# Patient Record
Sex: MALE | Race: BLACK OR AFRICAN AMERICAN
[De-identification: names, ages, dates, MRNs, and addresses within clinical notes are randomized per-mention and may not be internally consistent; named-entity substitution may affect disease eponyms.]

---

## 2017-06-13 ENCOUNTER — HOSPITAL ENCOUNTER (INPATIENT)
Dept: HOSPITAL 17 - NEPD | Age: 62
LOS: 3 days | Discharge: HOME | DRG: 308 | End: 2017-06-16
Attending: HOSPITALIST | Admitting: HOSPITALIST
Payer: COMMERCIAL

## 2017-06-13 VITALS
TEMPERATURE: 97.2 F | DIASTOLIC BLOOD PRESSURE: 89 MMHG | HEART RATE: 73 BPM | SYSTOLIC BLOOD PRESSURE: 156 MMHG | RESPIRATION RATE: 18 BRPM | OXYGEN SATURATION: 95 %

## 2017-06-13 VITALS
SYSTOLIC BLOOD PRESSURE: 157 MMHG | HEART RATE: 108 BPM | DIASTOLIC BLOOD PRESSURE: 111 MMHG | OXYGEN SATURATION: 97 % | RESPIRATION RATE: 20 BRPM

## 2017-06-13 VITALS
HEART RATE: 113 BPM | RESPIRATION RATE: 16 BRPM | OXYGEN SATURATION: 96 % | TEMPERATURE: 97.9 F | DIASTOLIC BLOOD PRESSURE: 109 MMHG | SYSTOLIC BLOOD PRESSURE: 178 MMHG

## 2017-06-13 VITALS
DIASTOLIC BLOOD PRESSURE: 106 MMHG | OXYGEN SATURATION: 97 % | TEMPERATURE: 97.4 F | RESPIRATION RATE: 18 BRPM | HEART RATE: 90 BPM | SYSTOLIC BLOOD PRESSURE: 152 MMHG

## 2017-06-13 VITALS
OXYGEN SATURATION: 96 % | HEART RATE: 87 BPM | RESPIRATION RATE: 16 BRPM | DIASTOLIC BLOOD PRESSURE: 59 MMHG | SYSTOLIC BLOOD PRESSURE: 121 MMHG | TEMPERATURE: 97.5 F

## 2017-06-13 VITALS
OXYGEN SATURATION: 98 % | SYSTOLIC BLOOD PRESSURE: 150 MMHG | HEART RATE: 104 BPM | DIASTOLIC BLOOD PRESSURE: 88 MMHG | RESPIRATION RATE: 20 BRPM

## 2017-06-13 VITALS
DIASTOLIC BLOOD PRESSURE: 75 MMHG | RESPIRATION RATE: 20 BRPM | SYSTOLIC BLOOD PRESSURE: 144 MMHG | HEART RATE: 102 BPM | OXYGEN SATURATION: 98 %

## 2017-06-13 VITALS — HEIGHT: 67 IN | BODY MASS INDEX: 29.55 KG/M2 | WEIGHT: 188.27 LBS

## 2017-06-13 VITALS — OXYGEN SATURATION: 95 %

## 2017-06-13 VITALS — HEART RATE: 92 BPM

## 2017-06-13 DIAGNOSIS — I48.91: Primary | ICD-10-CM

## 2017-06-13 DIAGNOSIS — I11.0: ICD-10-CM

## 2017-06-13 DIAGNOSIS — M06.9: ICD-10-CM

## 2017-06-13 DIAGNOSIS — M48.00: ICD-10-CM

## 2017-06-13 DIAGNOSIS — F17.210: ICD-10-CM

## 2017-06-13 DIAGNOSIS — M19.90: ICD-10-CM

## 2017-06-13 DIAGNOSIS — J45.909: ICD-10-CM

## 2017-06-13 DIAGNOSIS — I50.21: ICD-10-CM

## 2017-06-13 LAB
ANION GAP SERPL CALC-SCNC: 7 MEQ/L (ref 5–15)
APTT BLD: 25.2 SEC (ref 24.3–30.1)
BASOPHILS # BLD AUTO: 0.1 TH/MM3 (ref 0–0.2)
BASOPHILS NFR BLD: 0.7 % (ref 0–2)
BUN SERPL-MCNC: 14 MG/DL (ref 7–18)
CHLORIDE SERPL-SCNC: 109 MEQ/L (ref 98–107)
EOSINOPHIL # BLD: 0.2 TH/MM3 (ref 0–0.4)
EOSINOPHIL NFR BLD: 2.3 % (ref 0–4)
ERYTHROCYTE [DISTWIDTH] IN BLOOD BY AUTOMATED COUNT: 14.3 % (ref 11.6–17.2)
GFR SERPLBLD BASED ON 1.73 SQ M-ARVRAT: 84 ML/MIN (ref 89–?)
HCO3 BLD-SCNC: 24.4 MEQ/L (ref 21–32)
HCT VFR BLD CALC: 44 % (ref 39–51)
HEMO FLAGS: (no result)
INR PPP: 0.9 RATIO
LYMPHOCYTES # BLD AUTO: 1.9 TH/MM3 (ref 1–4.8)
LYMPHOCYTES NFR BLD AUTO: 24 % (ref 9–44)
MAGNESIUM SERPL-MCNC: 1.9 MG/DL (ref 1.5–2.5)
MCH RBC QN AUTO: 29.1 PG (ref 27–34)
MCHC RBC AUTO-ENTMCNC: 31.8 % (ref 32–36)
MCV RBC AUTO: 91.6 FL (ref 80–100)
MONOCYTES NFR BLD: 3.4 % (ref 0–8)
NEUTROPHILS # BLD AUTO: 5.5 TH/MM3 (ref 1.8–7.7)
NEUTROPHILS NFR BLD AUTO: 69.6 % (ref 16–70)
PLATELET # BLD: 235 TH/MM3 (ref 150–450)
POTASSIUM SERPL-SCNC: 4.8 MEQ/L (ref 3.5–5.1)
PROTHROMBIN TIME: 10.4 SEC (ref 9.8–11.6)
RBC # BLD AUTO: 4.81 MIL/MM3 (ref 4.5–5.9)
SODIUM SERPL-SCNC: 140 MEQ/L (ref 136–145)
WBC # BLD AUTO: 7.9 TH/MM3 (ref 4–11)

## 2017-06-13 PROCEDURE — 85730 THROMBOPLASTIN TIME PARTIAL: CPT

## 2017-06-13 PROCEDURE — 71010: CPT

## 2017-06-13 PROCEDURE — 80048 BASIC METABOLIC PNL TOTAL CA: CPT

## 2017-06-13 PROCEDURE — 85610 PROTHROMBIN TIME: CPT

## 2017-06-13 PROCEDURE — 84484 ASSAY OF TROPONIN QUANT: CPT

## 2017-06-13 PROCEDURE — 83036 HEMOGLOBIN GLYCOSYLATED A1C: CPT

## 2017-06-13 PROCEDURE — 90732 PPSV23 VACC 2 YRS+ SUBQ/IM: CPT

## 2017-06-13 PROCEDURE — 93005 ELECTROCARDIOGRAM TRACING: CPT

## 2017-06-13 PROCEDURE — 85025 COMPLETE CBC W/AUTO DIFF WBC: CPT

## 2017-06-13 PROCEDURE — 80061 LIPID PANEL: CPT

## 2017-06-13 PROCEDURE — 82272 OCCULT BLD FECES 1-3 TESTS: CPT

## 2017-06-13 PROCEDURE — 85027 COMPLETE CBC AUTOMATED: CPT

## 2017-06-13 PROCEDURE — 83735 ASSAY OF MAGNESIUM: CPT

## 2017-06-13 PROCEDURE — 84443 ASSAY THYROID STIM HORMONE: CPT

## 2017-06-13 PROCEDURE — 96375 TX/PRO/DX INJ NEW DRUG ADDON: CPT

## 2017-06-13 PROCEDURE — 83880 ASSAY OF NATRIURETIC PEPTIDE: CPT

## 2017-06-13 PROCEDURE — 96374 THER/PROPH/DIAG INJ IV PUSH: CPT

## 2017-06-13 PROCEDURE — 93306 TTE W/DOPPLER COMPLETE: CPT

## 2017-06-13 RX ADMIN — HEPARIN SODIUM SCH MLS/HR: 10000 INJECTION, SOLUTION INTRAVENOUS at 15:52

## 2017-06-13 RX ADMIN — SODIUM CHLORIDE SCH MLS/HR: 900 INJECTION, SOLUTION INTRAVENOUS at 15:30

## 2017-06-13 NOTE — EKG
Date Performed: 06/13/2017       Time Performed: 14:48:48

 

PTAGE:      61 years

 

EKG:      Multifocal atrial tachycardia Nonspecific T wave changes ABNORMAL ECG 

 

NO PREVIOUS TRACING            

 

DOCTOR:   Guido Ramires  Interpretating Date/Time  06/13/2017 18:07:16

## 2017-06-13 NOTE — PD
HPI


Chief Complaint:  Respiratory Symptoms


Time Seen by Provider:  14:43


Travel History


International Travel<30 days:  No


Contact w/Intl Traveler<30days:  No


Traveled to known affect area:  No





History of Present Illness


HPI


61-year-old male came to the emergency room with history of shortness of 

breath.  Patient was tachycardic in triage.  He was atrial fibrillation with 

RVR.  Patient has history of atrial fibrillation but not on any medications a 

blood thinners.  He says he has been short of breath for past 1 week.  Patient 

has recently moved from [place] and does not have a primary care here.  He 

was short of breath sitting and talking to me.  There has been history of 

orthopnea.  No history of pedal edema or chest pain.  No history of fever or 

chills.  Heart rate was in 1 teens to 120s.





[facility]


Past Medical History


*** Narrative Medical


List of his past medical, surgical, social and family history as reviewed from 

the nursing note.


Respiratory:  Yes (asthma)





Social History


Tobacco Use:  No





Allergies-Medications


(Allergen,Severity, Reaction):  


Coded Allergies:  


     No Known Allergies (Unverified , 6/13/17)


Comments


No known allergies


Reported Meds & Prescriptions





Reported Meds & Active Scripts


Active


Reported


Oyster Shell Calcium + D (Calcium Carbonate-Cholecalciferol) 500-400 Mg-Unit 

Tab 1 Tab PO DAILY


Motrin Ib (Ibuprofen) 200 Mg Tablet 800 Mg PO  PRN


Methotrexate 2.5 Mg Tab 20 Mg PO Q7D





Narrative Medication


List of her medications a been reviewed from the nursing note.





Review of Systems


Except as stated in HPI:  all other systems reviewed are Neg





Physical Exam


Narrative


GENERAL: Awake, alert, moderate distress


SKIN: Focused skin assessment warm/dry.


HEAD: Atraumatic. Normocephalic. 


EYES: Pupils equal and round. No scleral icterus. No injection or drainage. 


ENT: No nasal bleeding or discharge.  Mucous membranes pink and moist.


NECK: Trachea midline. No JVD. 


CARDIOVASCULAR: Irregularly irregular rhythm, RVR.  No murmur appreciated.


RESPIRATORY: Tachypnea. Clear to auscultation. Breath sounds equal bilaterally. 


GASTROINTESTINAL: Abdomen soft, non-tender, nondistended. Hepatic and splenic 

margins not palpable. 


MUSCULOSKELETAL: No obvious deformities. No clubbing.  No cyanosis.  No edema. 


NEUROLOGICAL: Awake and alert. No obvious cranial nerve deficits.  Motor 

grossly within normal limits. Normal speech.


PSYCHIATRIC: Appropriate mood and affect; insight and judgment normal.





Data


Data


Last Documented VS





Vital Signs








  Date Time  Temp Pulse Resp B/P Pulse Ox O2 Delivery O2 Flow Rate FiO2


 


6/13/17 16:03  108 20 157/111 97 Room Air  


 


6/13/17 14:47 97.9       








Orders





 Complete Blood Count With Diff (6/13/17 14:57)


Basic Metabolic Panel (Bmp) (6/13/17 14:57)


B-Type Natriuretic Peptide (6/13/17 14:57)


Prothrombin Time / Inr (Pt) (6/13/17 14:57)


Magnesium (Mg) (6/13/17 14:57)


Troponin I (6/13/17 14:57)


Iv Access Insert/Monitor (6/13/17 14:57)


Ecg Monitoring (6/13/17 14:57)


Oximetry (6/13/17 14:57)


Oxygen Administration (6/13/17 14:57)


Chest, Single Ap (6/13/17 14:57)


Sodium Chloride 0.9% Flush (Ns Flush) (6/13/17 15:00)


Vital Signs (Adult) Q15MX4,Q4H (6/13/17 14:57)


Cardiac Monitor / Telemetry MELE.Q8H (6/13/17 14:57)


Cardiac Rhythm MELE.Q8H (6/13/17 14:57)


Notify Dr: Other (6/13/17 14:57)


Diltiazem Inj (Cardizem Inj) (6/13/17 15:00)


Diltiazem Inj (Cardizem Inj) (6/13/17 15:00)


Heparin Infusion MELE.Q1H (6/13/17 15:25)


Heparin Inj (Heparin Inj) (6/13/17 15:30)


Heparin Inj (Heparin Inj) (6/13/17 21:30)


Heparin Inj (Heparin Inj) (6/13/17 21:30)


Heparin-D5w Inj (Heparin-D5w Inj) (6/13/17 15:30)


Act Partial Throm Time (Ptt) (6/13/17 15:25)


Cbc No Diff, Includes Plts (6/16/17 06:00)


Act Partial Throm Time (Ptt) (6/13/17 22:25)


Occult Blood (Hemoccult) Stool (6/13/17 15:25)


Electrocardiogram (6/13/17 14:48)


Admit Order (Ed Use Only) (6/13/17 16:17)





Labs





 Laboratory Tests








Test 6/13/17





 15:08


 


White Blood Count 7.9 TH/MM3


 


Red Blood Count 4.81 MIL/MM3


 


Hemoglobin 14.0 GM/DL


 


Hematocrit 44.0 %


 


Mean Corpuscular Volume 91.6 FL


 


Mean Corpuscular Hemoglobin 29.1 PG


 


Mean Corpuscular Hemoglobin 31.8 %





Concent 


 


Red Cell Distribution Width 14.3 %


 


Platelet Count 235 TH/MM3


 


Mean Platelet Volume 10.3 FL


 


Neutrophils (%) (Auto) 69.6 %


 


Lymphocytes (%) (Auto) 24.0 %


 


Monocytes (%) (Auto) 3.4 %


 


Eosinophils (%) (Auto) 2.3 %


 


Basophils (%) (Auto) 0.7 %


 


Neutrophils # (Auto) 5.5 TH/MM3


 


Lymphocytes # (Auto) 1.9 TH/MM3


 


Monocytes # (Auto) 0.3 TH/MM3


 


Eosinophils # (Auto) 0.2 TH/MM3


 


Basophils # (Auto) 0.1 TH/MM3


 


CBC Comment DIFF FINAL 


 


Differential Comment  


 


Prothrombin Time 10.4 SEC


 


Prothromb Time International 0.9 RATIO





Ratio 


 


Activated Partial 25.2 SEC





Thromboplast Time 


 


Sodium Level 140 MEQ/L


 


Potassium Level 4.8 MEQ/L


 


Chloride Level 109 MEQ/L


 


Carbon Dioxide Level 24.4 MEQ/L


 


Anion Gap 7 MEQ/L


 


Blood Urea Nitrogen 14 MG/DL


 


Creatinine 1.09 MG/DL


 


Estimat Glomerular Filtration 84 ML/MIN





Rate 


 


Random Glucose 75 MG/DL


 


Calcium Level 8.7 MG/DL


 


Magnesium Level 1.9 MG/DL


 


Troponin I 0.08 NG/ML


 


B-Type Natriuretic Peptide 402 PG/ML











MDM


Medical Decision Making


Medical Screen Exam Complete:  Yes


Emergency Medical Condition:  Yes


Medical Record Reviewed:  Yes


Interpretation(s)


Twelve-lead EKG was reviewed by me.  Atrial fibrillation, RVR, left axis 

deviation.  Heart rate of 120 bpm.


Differential Diagnosis


Atrial fibrillation, RVR, CHF, ACS


Narrative Course


3:34 PM patient was given IV Cardizem bolus 20 mg along with a drip.  Patient 

is not on any blood thinners.  I'm starting him on heparin bolus and drip.  

Awaiting for the blood tests and chest x-ray to be resulted.  Patient will 

eventually require admission.


Critical Care Narrative


Aggregate critical care time was 45 minutes. Time to perform other separately 

billable procedures was not  


included in the critical care time. My time did not include minutes spent 

treating any other patients simultaneously or on  


activities that did not directly contribute to the patient's treatment.  





The services I provided to this patient were to treat and/or prevent clinically 

significant deterioration that could result  


in: A. fib with RVR, dyspnea, elevated troponin, Cardizem bolus and drip, 

heparin bolus and drip





I provided critical care services requiring my management, as noted below:


Chart data review, documentation time, medication orders and management, vital 

sign assessments/reviewing monitor data,  


ordering and reviewing lab tests, ordering and interpreting/reviewing x-rays 

and diagnostic studies, care of the patient  


and discussion of the patient with the admitting physicians.





Procedures


EKG Prior to Arrival:  No





Diagnosis





 Primary Impression:  


 Atrial fibrillation with RVR


 Additional Impressions:  


 Shortness of breath at rest


 Elevated troponin I level


 CHF (congestive heart failure)


 Qualified Code:  I50.9 - Congestive heart failure, unspecified congestive 

heart failure chronicity, unspecified congestive heart failure type





Admitting Information


Admitting Physician Requests:  Admit


Scripts


Oxycodone 5 Mg Tab5 Mg PO Q6HR PRN (pain) #14 TAB


   Prov:Erik García DO         6/16/17 


Metoprolol Tartrate (Lopressor)50 Mg Tab75 Mg PO Q12HR  #90 TAB


   Prov:Erik García DO         6/16/17 


Apixaban (Eliquis)5 Mg Tab5 Mg PO BID  #60 TAB


   Prov:Erik García DO         6/16/17 


Folic Acid 20 Mg Cap10 Mg PO DAILY  #0 CAP


   Prov:Erik García DO         6/16/17








Randolph Jacques MD Jun 13, 2017 14:44

## 2017-06-13 NOTE — MB
cc:

NALLELY QUEZADA MD

****

 

 

DATE OF CONSULTATION

6/13/2017

 

REASON FOR CONSULTATION

Atrial fibrillation with rapid ventricular rate.

 

HISTORY OF PRESENT ILLNESS

Mr. Kowalski is a 61-year-old man who does have a history of irregular heartbeat.

He presented to the emergency room with shortness of breath over the last 3

days and fatigue over the last week.  The patient was noted to be in atrial

fibrillation with rapid ventricular rate in the emergency room.  Cardiology was

subsequently consulted.

 

PAST MEDICAL HISTORY

Significant for:

1. Dysrhythmia.

2. Hypertension.

3. Asthma.

 

ALLERGIES

NO KNOWN DRUG ALLERGIES.

 

MEDICATIONS

Outpatient medications include:

1. Motrin.

2. Amlodipine 5 milligrams daily.

3. Losartan 12.5 milligrams daily.

4. Methotrexate 2.5 mg a day.

 

REVIEW OF SYSTEMS

Except as mentioned in the HPI all 12 systems are negative.

 

FAMILY HISTORY

Positive for mother who passed away of an myocardial infarction.

 

SOCIAL HISTORY

The patient does smoke three cigarettes a day and has occasional alcohol use.

 

PHYSICAL EXAMINATION

VITAL SIGNS: 102, 20, 134/75.

GENERAL: He is a well appearing man who is in no apparent distress.

NECK: Free from JVD.

LUNGS: Decreased but clear to auscultation.

CARDIOVASCULAR: On examination he has an irregularly irregular rhythm. No rubs

or gallops are appreciated.

ABDOMEN: Soft.

EXTREMITIES: Are free from edema.

 

IMAGING

Chest x-ray is negative for any acute process.

 

LABORATORY FINDINGS

Significant for white count of 7.9.

 

Creatinine of 1.09.

 

Troponin was 0.08.

 

And BNP is 402.

 

EKG does show atrial fibrillation with rapid ventricular rate.

 

IMPRESSION

1. Atrial fibrillation - the patient does have a history of an  irregular

     heartbeat.  It is not clear if he does have a history of atrial

     fibrillation. In case I do agree with the Cardize for rate control and BP

     control, particularly as he does have a history of asthma as well. The

     patient's [id number] score is currently 1 for hypertension, thus long

     term anticoagulation with aspirin would be reasonable.

2. Hypertension - I do agree with discontinuing the amlodipine and losartan.

3. Indeterminate troponin - this is likely secondary to the atrial fibrillation

     with rapid ventricular rate.

 

 

 

                              _________________________________

                              RYANN Armendariz/KK

D:  6/13/2017/6:13 PM

T:  6/13/2017/6:25 PM

Visit #:  [id number]

Job #:  [id number]

## 2017-06-13 NOTE — HHI.HP
__________________________________________________





HPI


[facility]


[facility]


Primary Care Physician


No Primary Care Physician


Admission Diagnosis


A. fib with RVR, shortness of breath


Diagnoses:  


Chief Complaint:  


Shortness of breath


Travel History


International Travel<30 Days:  No


Contact w/Intl Traveler <30 Da:  No


Traveled to Known Affected Are:  No


History of Present Illness


61-year-old male with past medical history of HTN, RA, spinal stenosis, DJD, OA 

who presented for shortness of breath.  The patient states that he's been 

having shortness breath and dyspnea with exertion for the past week.  He states 

that is been getting worse over the past 2 days.  He complains of palpitations 

and feeling like his heart is racing.  He denies any chest pain.  He denies 

ever experiencing any symptoms like this before.  He states someone mentioned 

about 30 years ago that he might have an irregular heartbeat.  He denies ever 

being told he has atrial fibrillation.  He's never seen a cardiologist.  He 

denies any recent illness, but has had a cough productive with white sputum for 

the last 3 days.  He denies any fever, chills, nausea, vomiting, diarrhea.





Review of Systems


Except as stated in HPI:  all other systems reviewed are Neg


Ambulates using a cane





Past Family Social History


Past Medical History


Hypertension


Rheumatoid arthritis/spinal stenosis/degenerative joint disease/osteoarthritis


Past Surgical History


Right knee surgery 2


Left knee surgery 2


Spinal surgery 2


Reported Medications





Oyster Shell Calcium + D (Calcium Carbonate-Cholecalciferol) 500-400 Mg-Unit 

Tab 1 Tab PO DAILY


Os-Masood Calcium + D3 (Calcium Carbonate-Cholecalciferol) 500-200 Mg-Unit Tab 1 

Tab PO DAILY


Folic Acid 20 Mg Cap 20 Mg PO DAILY


Motrin Ib (Ibuprofen) 200 Mg Tablet 800 Mg PO  PRN


Amlodipine (Amlodipine Besylate) 5 Mg Tab 5 Mg PO DAILY


Losartan (Losartan Potassium) 25 Mg Tab 12.5 Mg PO DAILY


Methotrexate 2.5 Mg Tab 20 Mg PO Q7D


Allergies:  


Coded Allergies:  


     No Known Allergies (Unverified , 6/13/17)


Active Ordered Medications





 Current Medications








 Medications


  (Trade)  Dose


 Ordered  Sig/Desire


 Route  Start Time


 Stop Time Status Last Admin


 


 Sodium Chloride 2


 ml  2 ml  UNSCH  PRN


 IVF  6/13/17 15:00


     


 


 


  (Cardizem Inj/NS


 Inj)  125 ml @ 0


 mls/hr  TITRATE


 IV  6/13/17 15:00


    6/13/17 15:30


 


 


  (Heparin Inj)  5,000 units  UNSCH  PRN


 IV  6/13/17 21:30


     


 


 


 Heparin Sodium


  (Porcine) 2500


 units  2,500 units  UNSCH  PRN


 IV  6/13/17 21:30


     


 


 


  (Heparin-D5W Inj)  250 ml @ 0


 mls/hr  TITRATE


 IV  6/13/17 15:30


    6/13/17 15:52


 


 


  (Cozaar)  12.5 mg  DAILY


 PO  6/14/17 09:00


     


 


 


 Non-Formulary


 Medication  20 mg  DAILY


 PO  6/14/17 09:00


   UNV  


 


 


  (Pill Splitter)  1 ea  UNSCH  PRN


 OTHER  6/13/17 16:45


     


 








Family History


Mother passed away of a heart attack


Social History


Smokes about 3 cigarettes daily


Has a couple of beers twice a week 


Denies any substance use





Physical Exam


Vital Signs





 Vital Signs








  Date Time  Temp Pulse Resp B/P Pulse Ox O2 Delivery O2 Flow Rate FiO2


 


6/13/17 16:03  108 20 157/111 97 Room Air  


 


6/13/17 15:18     98 Room Air  


 


6/13/17 14:47   16  96   


 


6/13/17 14:47 97.9 113 16 178/109 96 Room Air  


 


6/13/17 13:31 97.4 90 18 152/106 97   








Physical Exam


GENERAL: Well-developed well-nourished.  In no acute distress.


SKIN: Warm and dry. No lesions noted.


HEENT: Normocephalic. Pupils equal and round. Mucous membranes pink and moist. 


CARDIOVASCULAR: Tachycardic irregular rate and rhythm.  No murmur appreciated.


RESPIRATORY: No accessory muscle use. Clear to auscultation. Breath sounds 

equal bilaterally. 


GASTROINTESTINAL: Abdomen soft, non-tender, nondistended. Bowel sounds x4.


MUSCULOSKELETAL: No obvious deformities. No clubbing or cyanosis.  Trace 

pitting lower extremity edema. 


NEUROLOGICAL: Awake and alert. No focal neurological deficits.  Moves upper and 

lower extremities spontaneously. Normal speech.  Strength 5/5.


PSYCHIATRIC: Appropriate mood and affect; insight and judgment normal.


Laboratory





Laboratory Tests








Test 6/13/17





 15:08


 


White Blood Count 7.9 


 


Red Blood Count 4.81 


 


Hemoglobin 14.0 


 


Hematocrit 44.0 


 


Mean Corpuscular Volume 91.6 


 


Mean Corpuscular Hemoglobin 29.1 


 


Mean Corpuscular Hemoglobin 31.8 





Concent 


 


Red Cell Distribution Width 14.3 


 


Platelet Count 235 


 


Mean Platelet Volume 10.3 


 


Neutrophils (%) (Auto) 69.6 


 


Lymphocytes (%) (Auto) 24.0 


 


Monocytes (%) (Auto) 3.4 


 


Eosinophils (%) (Auto) 2.3 


 


Basophils (%) (Auto) 0.7 


 


Neutrophils # (Auto) 5.5 


 


Lymphocytes # (Auto) 1.9 


 


Monocytes # (Auto) 0.3 


 


Eosinophils # (Auto) 0.2 


 


Basophils # (Auto) 0.1 


 


CBC Comment DIFF FINAL 


 


Differential Comment  


 


Prothrombin Time 10.4 


 


Prothromb Time International 0.9 





Ratio 


 


Sodium Level 140 


 


Potassium Level 4.8 


 


Chloride Level 109 


 


Carbon Dioxide Level 24.4 


 


Anion Gap 7 


 


Blood Urea Nitrogen 14 


 


Creatinine 1.09 


 


Estimat Glomerular Filtration 84 





Rate 


 


Random Glucose 75 


 


Calcium Level 8.7 


 


Magnesium Level 1.9 


 


Troponin I 0.08 


 


B-Type Natriuretic Peptide 402 








Result Diagram:  


6/13/17 1508                                                                   

             6/13/17 1508





Imaging





Last Impressions








Chest X-Ray 6/13/17 1457 Signed





Impressions: 





 Service Date/Time:  Tuesday, June 13, 2017 15:01 - CONCLUSION: No acute 

disease. 





       Bakari Olsen MD 











Assessment and Plan


Assessment and Plan


61-year-old male with past medical history of HTN, RA, spinal stenosis, DJD, OA 

who presented for shortness of breath





New-onset atrial fibrillation with rapid ventricular response: Chadsvasc 1


Reviewed: EKG with A. fib with RVR, rate 120, no definite ischemic changes.  

Troponin 0.08.  .  Chest x-ray clear.  Potassium magnesium within normal 

limits.


-Cardizem and heparin drips started in the ED, continue


-Trend troponins and check TSH


-Start metoprolol and aspirin


-Check echocardiogram


-Consult cardiology





Hypertension: Chronic, currently accelerated.  Hold home amlodipine on 

Cardizem.  Continue losartan.  Started metoprolol.


Discussed Condition With


Patient with SO at bedside, Reece Owusu Jun 13, 2017 17:03

## 2017-06-13 NOTE — RADRPT
EXAM DATE/TIME:  06/13/2017 15:01 

 

[facility] COMPARISON:     

No previous studies available for comparison.

 

                     

INDICATIONS :     

Shortness of breath.

                     

 

MEDICAL HISTORY :     

Hypertension.       Asthma.   

 

SURGICAL HISTORY :     

None.   

 

ENCOUNTER:     

Initial                                        

 

ACUITY:     

1 day      

 

PAIN SCORE:     

0/10

 

LOCATION:     

Bilateral chest 

 

FINDINGS:     

A single view of the chest demonstrates the lungs to be symmetrically aerated without evidence of mas
s, infiltrate or effusion.  The cardiomediastinal contours are unremarkable.  Osseous structures are 
intact.

 

CONCLUSION:     No acute disease.  

 

 

 

 Bakari lOsen MD on June 13, 2017 at 15:32           

Board Certified Radiologist.

 This report was verified electronically.

## 2017-06-14 VITALS
OXYGEN SATURATION: 95 % | HEART RATE: 48 BPM | TEMPERATURE: 97.6 F | DIASTOLIC BLOOD PRESSURE: 83 MMHG | SYSTOLIC BLOOD PRESSURE: 135 MMHG | RESPIRATION RATE: 18 BRPM

## 2017-06-14 VITALS
HEART RATE: 52 BPM | RESPIRATION RATE: 18 BRPM | DIASTOLIC BLOOD PRESSURE: 97 MMHG | SYSTOLIC BLOOD PRESSURE: 153 MMHG | OXYGEN SATURATION: 93 % | TEMPERATURE: 97.5 F

## 2017-06-14 VITALS — OXYGEN SATURATION: 94 %

## 2017-06-14 VITALS
TEMPERATURE: 97.8 F | HEART RATE: 103 BPM | RESPIRATION RATE: 18 BRPM | DIASTOLIC BLOOD PRESSURE: 86 MMHG | OXYGEN SATURATION: 95 % | SYSTOLIC BLOOD PRESSURE: 122 MMHG

## 2017-06-14 VITALS
DIASTOLIC BLOOD PRESSURE: 70 MMHG | RESPIRATION RATE: 18 BRPM | HEART RATE: 94 BPM | SYSTOLIC BLOOD PRESSURE: 104 MMHG | TEMPERATURE: 97.9 F | OXYGEN SATURATION: 98 %

## 2017-06-14 VITALS
DIASTOLIC BLOOD PRESSURE: 79 MMHG | SYSTOLIC BLOOD PRESSURE: 133 MMHG | RESPIRATION RATE: 16 BRPM | HEART RATE: 74 BPM | OXYGEN SATURATION: 97 % | TEMPERATURE: 97.4 F

## 2017-06-14 VITALS — HEART RATE: 90 BPM

## 2017-06-14 VITALS
SYSTOLIC BLOOD PRESSURE: 122 MMHG | RESPIRATION RATE: 18 BRPM | TEMPERATURE: 97.6 F | OXYGEN SATURATION: 94 % | HEART RATE: 78 BPM | DIASTOLIC BLOOD PRESSURE: 73 MMHG

## 2017-06-14 VITALS — HEART RATE: 107 BPM

## 2017-06-14 VITALS — HEART RATE: 88 BPM

## 2017-06-14 VITALS — HEART RATE: 91 BPM

## 2017-06-14 LAB
ANION GAP SERPL CALC-SCNC: 13 MEQ/L (ref 5–15)
APTT BLD: 65.8 SEC (ref 24.3–30.1)
APTT BLD: 70.3 SEC (ref 24.3–30.1)
BUN SERPL-MCNC: 12 MG/DL (ref 7–18)
CHLORIDE SERPL-SCNC: 104 MEQ/L (ref 98–107)
ERYTHROCYTE [DISTWIDTH] IN BLOOD BY AUTOMATED COUNT: 14.5 % (ref 11.6–17.2)
GFR SERPLBLD BASED ON 1.73 SQ M-ARVRAT: 108 ML/MIN (ref 89–?)
HCO3 BLD-SCNC: 22.5 MEQ/L (ref 21–32)
HCT VFR BLD CALC: 42.5 % (ref 39–51)
HDLC SERPL-MCNC: 51.7 MG/DL (ref 40–60)
HEMOGLOBIN A1A: 0.9 %
HEMOGLOBIN A1B: 1.7 %
HEMOGLOBIN AO: 84.6 %
HEMOGLOBIN LA1C: 2 %
HEMOGLOBIN P3: 4 %
LDLC SERPL-MCNC: 61 MG/DL (ref 0–99)
MCH RBC QN AUTO: 28.5 PG (ref 27–34)
MCHC RBC AUTO-ENTMCNC: 31.8 % (ref 32–36)
MCV RBC AUTO: 89.7 FL (ref 80–100)
PLATELET # BLD: 224 TH/MM3 (ref 150–450)
POTASSIUM SERPL-SCNC: 3.2 MEQ/L (ref 3.5–5.1)
RBC # BLD AUTO: 4.74 MIL/MM3 (ref 4.5–5.9)
REVIEW FLAG: (no result)
SODIUM SERPL-SCNC: 139 MEQ/L (ref 136–145)
WBC # BLD AUTO: 8 TH/MM3 (ref 4–11)

## 2017-06-14 RX ADMIN — FUROSEMIDE SCH MG: 20 TABLET ORAL at 09:25

## 2017-06-14 RX ADMIN — METOPROLOL TARTRATE SCH MG: 50 TABLET, FILM COATED ORAL at 20:22

## 2017-06-14 RX ADMIN — HEPARIN SODIUM SCH MLS/HR: 10000 INJECTION, SOLUTION INTRAVENOUS at 21:46

## 2017-06-14 RX ADMIN — FOLIC ACID SCH MG: 1 TABLET ORAL at 13:10

## 2017-06-14 RX ADMIN — SODIUM CHLORIDE SCH MLS/HR: 900 INJECTION, SOLUTION INTRAVENOUS at 13:52

## 2017-06-14 RX ADMIN — POTASSIUM CHLORIDE SCH MEQ: 750 TABLET, FILM COATED, EXTENDED RELEASE ORAL at 09:26

## 2017-06-14 RX ADMIN — POTASSIUM CHLORIDE SCH MEQ: 750 TABLET, FILM COATED, EXTENDED RELEASE ORAL at 20:22

## 2017-06-14 RX ADMIN — HEPARIN SODIUM SCH MLS/HR: 10000 INJECTION, SOLUTION INTRAVENOUS at 05:16

## 2017-06-14 RX ADMIN — METOPROLOL TARTRATE SCH MG: 50 TABLET, FILM COATED ORAL at 13:09

## 2017-06-14 RX ADMIN — FUROSEMIDE SCH MG: 20 TABLET ORAL at 18:05

## 2017-06-14 RX ADMIN — ASPIRIN 81 MG SCH MG: 81 TABLET ORAL at 09:25

## 2017-06-14 NOTE — PD.CARD.PN
Subjective


Subjective Remarks


Pt with SHOB overnight requiring lasix





Objective


Medications





 Current Medications








 Medications


  (Trade)  Dose


 Ordered  Sig/Desire


 Route  Start Time


 Stop Time Status Last Admin


 


 Sodium Chloride 2


 ml  2 ml  UNSCH  PRN


 IVF  6/13/17 15:00


     


 


 


  (Cardizem Inj/NS


 Inj)  125 ml @ 0


 mls/hr  TITRATE


 IV  6/13/17 15:00


    6/13/17 15:30


 


 


  (Heparin Inj)  5,000 units  UNSCH  PRN


 IV  6/13/17 21:30


     


 


 


 Heparin Sodium


  (Porcine) 2500


 units  2,500 units  UNSCH  PRN


 IV  6/13/17 21:30


     


 


 


  (Heparin-D5W Inj)  250 ml @ 0


 mls/hr  TITRATE


 IV  6/13/17 15:30


    6/14/17 05:16


 


 


 Non-Formulary


 Medication  20 mg  DAILY


 PO  6/14/17 09:00


   UNV  


 


 


  (Pill Splitter)  1 ea  UNSCH  PRN


 OTHER  6/13/17 16:45


     


 


 


  (Aspirin Chew)  81 mg  DAILY


 PO  6/14/17 09:00


     


 


 


  (Cardizem Cd)  120 mg  Q12HR


 PO  6/13/17 21:00


    6/13/17 21:21


 


 


  (Pneumovax-23


 Inj)  25 mcg  ONCE ONCE


 IM  6/14/17 10:00


 6/14/17 10:01   


 








Vital Signs / I&O





 Vital Signs








  Date Time  Temp Pulse Resp B/P Pulse Ox O2 Delivery O2 Flow Rate FiO2


 


6/14/17 05:30     97 Room Air  


 


6/14/17 03:35     98 Nasal Cannula 2.00 


 


6/14/17 03:25     96 Room Air  


 


6/14/17 00:00 97.4 93 16 130/79 97   


 


6/14/17 00:00      Room Air  


 


6/13/17 21:45     97 Room Air  


 


6/13/17 20:24  92      


 


6/13/17 20:00      Room Air  


 


6/13/17 20:00 97.5 87 16 121/59 96   


 


6/13/17 18:32      Room Air  


 


6/13/17 18:24 97.2 73 18 156/89 95   


 


6/13/17 17:36  102 20 144/75 98 Room Air  


 


6/13/17 17:21     95   21


 


6/13/17 17:01  104 20 150/88 98 Room Air  


 


6/13/17 16:03  108 20 157/111 97 Room Air  


 


6/13/17 15:18     98 Room Air  


 


6/13/17 14:47   16  96   


 


6/13/17 14:47 97.9 113 16 178/109 96 Room Air  


 


6/13/17 13:31 97.4 90 18 152/106 97   








 I/O








 6/13/17 6/13/17 6/13/17 6/14/17 6/14/17 6/14/17





 07:00 15:00 23:00 07:00 15:00 23:00


 


Intake Total   900 ml   


 


Output Total   400 ml   


 


Balance   500 ml   


 


      


 


Intake Oral   900 ml   


 


Output Urine Total   400 ml   


 


# Bowel Movements   1   








Physical Exam


GENERAL: Well developed, well nourished. No acute distress.


HEENT: Jugular venous pressure is normal. 


CHEST: Lungs clear to auscultation bilaterally. Unlabored respiratory effort.


CARDIAC: irregular rate and rhythm without S3, S4, or murmur.


ABDOMEN: Soft, nontender, no hepatosplenomegaly. Bowel sounds present.


EXTREMITIES: No clubbing, cyanosis, or edema.


Laboratory





Laboratory Tests








Test 6/13/17 6/13/17





 15:08 23:23


 


White Blood Count 7.9 TH/MM3 


 


Red Blood Count 4.81 MIL/MM3 


 


Hemoglobin 14.0 GM/DL 


 


Hematocrit 44.0 % 


 


Mean Corpuscular Volume 91.6 FL 


 


Mean Corpuscular Hemoglobin 29.1 PG 


 


Mean Corpuscular Hemoglobin 31.8 % 





Concent  


 


Red Cell Distribution Width 14.3 % 


 


Platelet Count 235 TH/MM3 


 


Mean Platelet Volume 10.3 FL 


 


Neutrophils (%) (Auto) 69.6 % 


 


Lymphocytes (%) (Auto) 24.0 % 


 


Monocytes (%) (Auto) 3.4 % 


 


Eosinophils (%) (Auto) 2.3 % 


 


Basophils (%) (Auto) 0.7 % 


 


Neutrophils # (Auto) 5.5 TH/MM3 


 


Lymphocytes # (Auto) 1.9 TH/MM3 


 


Monocytes # (Auto) 0.3 TH/MM3 


 


Eosinophils # (Auto) 0.2 TH/MM3 


 


Basophils # (Auto) 0.1 TH/MM3 


 


CBC Comment DIFF FINAL  


 


Differential Comment   


 


Prothrombin Time 10.4 SEC 


 


Prothromb Time International 0.9 RATIO 





Ratio  


 


Activated Partial 25.2 SEC 65.8 SEC





Thromboplast Time  


 


Sodium Level 140 MEQ/L 


 


Potassium Level 4.8 MEQ/L 


 


Chloride Level 109 MEQ/L 


 


Carbon Dioxide Level 24.4 MEQ/L 


 


Anion Gap 7 MEQ/L 


 


Blood Urea Nitrogen 14 MG/DL 


 


Creatinine 1.09 MG/DL 


 


Estimat Glomerular Filtration 84 ML/MIN 





Rate  


 


Random Glucose 75 MG/DL 


 


Calcium Level 8.7 MG/DL 


 


Magnesium Level 1.9 MG/DL 


 


Troponin I 0.08 NG/ML 0.07 NG/ML


 


B-Type Natriuretic Peptide 402 PG/ML 


 


Thyroid Stimulating Hormone  1.630 uIU/ML





3rd Gen  








Imaging





Last 72 hours Impressions








Chest X-Ray 6/14/17 0000 Signed





Impressions: 





 Service Date/Time:  Wednesday, June 14, 2017 04:25 - CONCLUSION: Slight CHF.  

  





 K. Cristofer Lebron MD 


 


Chest X-Ray 6/13/17 1457 Signed





Impressions: 





 Service Date/Time:  Tuesday, June 13, 2017 15:01 - CONCLUSION: No acute 

disease. 





       Bakari Olsen MD 











Assessment and Plan


Assessment and Plan


1. Atrial fibrillation - the patient does have a history of an  irregular


     heartbeat.  It is not clear if he does have a history of atrial


     fibrillation. In case I do agree with the Cardizem for rate control and BP


     control, particularly as he does have a history of asthma as well. The


     patient's [id number] score is currently 1 for hypertension, thus long


     term anticoagulation with aspirin would be reasonable.


   6/14- still with some RVR, now also with CHF


   -change back to POBB


   -CHADS VASC =2 so should have OP anticoagulation, consult case management to 

see if eliquis or pradaxa are on his formulary





2. Hypertension - 


3. Indeterminate troponin - this is likely secondary to the atrial fibrillation


4. CHF- ECHO pending, 


   -add lasix, fluid and sodium restrictions


     with rapid ventricular rate.








Bhumika Alonzo MD Jun 14, 2017 07:28

## 2017-06-14 NOTE — HHI.PR
Subjective


Remarks


The patient was ambulating without difficulty.  He said that he had some edema 

in his lower extremities yesterday.  He said that he had some shortness of 

breath overnight that is improved.  He has been having bowel movements.  No 

acute complaints at this time.  He was about to have an echo performed.





Objective


Vitals





 Vital Signs








  Date Time  Temp Pulse Resp B/P Pulse Ox O2 Delivery O2 Flow Rate FiO2


 


6/14/17 08:00      Room Air  


 


6/14/17 05:30     97 Room Air  


 


6/14/17 04:00 97.6 78 18 122/73 94   


 


6/14/17 03:35     98 Nasal Cannula 2.00 


 


6/14/17 03:25     96 Room Air  


 


6/14/17 00:00 97.4 93 16 130/79 97   


 


6/14/17 00:00      Room Air  


 


6/13/17 21:45     97 Room Air  


 


6/13/17 20:24  92      


 


6/13/17 20:00      Room Air  


 


6/13/17 20:00 97.5 87 16 121/59 96   


 


6/13/17 18:32      Room Air  


 


6/13/17 18:24 97.2 73 18 156/89 95   


 


6/13/17 17:36  102 20 144/75 98 Room Air  


 


6/13/17 17:21     95   21


 


6/13/17 17:01  104 20 150/88 98 Room Air  


 


6/13/17 16:03  108 20 157/111 97 Room Air  


 


6/13/17 15:18     98 Room Air  


 


6/13/17 14:47   16  96   


 


6/13/17 14:47 97.9 113 16 178/109 96 Room Air  


 


6/13/17 13:31 97.4 90 18 152/106 97   








 I/O








 6/13/17 6/13/17 6/13/17 6/14/17 6/14/17 6/14/17





 07:00 15:00 23:00 07:00 15:00 23:00


 


Intake Total   900 ml 869 ml  


 


Output Total   400 ml 1000 ml  


 


Balance   500 ml -131 ml  


 


      


 


Intake Oral   900 ml 480 ml  


 


IV Total    289 ml  


 


Albumin    100 ml  


 


Output Urine Total   400 ml 1000 ml  


 


# Bowel Movements   1 0  








Result Diagram:  


6/13/17 1508                                                                   

             6/13/17 1508





Imaging





Last Impressions








Chest X-Ray 6/14/17 0000 Signed





Impressions: 





 Service Date/Time:  Wednesday, June 14, 2017 04:25 - CONCLUSION: Slight CHF.  

  





 CESAR Lebron MD 








Objective Remarks


GENERAL: Well-developed well-nourished.  In no acute distress.


SKIN: Warm and dry. No lesions noted.


HEENT: Normocephalic. Pupils equal and round. Mucous membranes pink and moist. 


CARDIOVASCULAR: Irregularly irregular.  No murmur appreciated.


RESPIRATORY: No accessory muscle use. Clear to auscultation. Breath sounds 

equal bilaterally. 


GASTROINTESTINAL: Abdomen soft, non-tender, nondistended. Bowel sounds x4.


MUSCULOSKELETAL: No obvious deformities. No clubbing or cyanosis.  Trace 

pitting lower extremity edema. 


NEUROLOGICAL: Awake and alert. No focal neurological deficits.  Moves upper and 

lower extremities spontaneously. Normal speech.  Strength 5/5.


PSYCHIATRIC: Appropriate mood and affect; insight and judgment normal.


Medications and IVs





 Current Medications








 Medications


  (Trade)  Dose


 Ordered  Sig/Desire


 Route  Start Time


 Stop Time Status Last Admin


 


 Sodium Chloride 2


 ml  2 ml  UNSCH  PRN


 IVF  6/13/17 15:00


     


 


 


  (Cardizem Inj/NS


 Inj)  125 ml @ 0


 mls/hr  TITRATE


 IV  6/13/17 15:00


    6/13/17 15:30


 


 


  (Heparin Inj)  5,000 units  UNSCH  PRN


 IV  6/13/17 21:30


     


 


 


 Heparin Sodium


  (Porcine) 2500


 units  2,500 units  UNSCH  PRN


 IV  6/13/17 21:30


     


 


 


  (Heparin-D5W Inj)  250 ml @ 0


 mls/hr  TITRATE


 IV  6/13/17 15:30


    6/14/17 05:16


 


 


 Non-Formulary


 Medication  20 mg  DAILY


 PO  6/14/17 09:00


   UNV  


 


 


  (Pill Splitter)  1 ea  UNSCH  PRN


 OTHER  6/13/17 16:45


     


 


 


  (Aspirin Chew)  81 mg  DAILY


 PO  6/14/17 09:00


     


 


 


  (Pneumovax-23


 Inj)  25 mcg  ONCE ONCE


 IM  6/14/17 10:00


 6/14/17 10:01   


 


 


  (Lopressor)  50 mg  Q8HR


 PO  6/14/17 14:00


     


 


 


  (Lasix)  20 mg  BID@09,18


 PO  6/14/17 09:00


     


 


 


  (KCl)  10 meq  Q12HR


 PO  6/14/17 09:00


     


 











A/P


Assessment and Plan


61-year-old male with past medical history of HTN, RA, spinal stenosis, DJD, OA 

who presented for shortness of breath





New-onset atrial fibrillation with rapid ventricular response


Chadsvasc 2. EKG with A. fib with RVR, rate 120, no definite ischemic changes.  

Troponin peaked at 0.08. TSH WNL.  Cardiology consult appreciated.


- continue Cardizem and heparin drips. PO Lopressor increased by cardiology.


- continue ASA. Will likely be started on full anticoagulation per cardiology.


- Check echocardiogram.





Acute CHF


Unsure if systolic or diastolic. Likely s/t A fib. The patient developed 

shortness of breath and repeat chest x-ray showed evidence of CHF.  BNP is 

elevated over 400.  He received Lasix and his symptoms improved.


- Echo pending.


- Continue cardiac regimen including Lasix.


- Check hemoglobin A1c and lipid profile.





Hypertension


Chronic, currently well controlled.  


- Hold home amlodipine.


- Continue losartan.  


- Started metoprolol.





PPx: Heparin gtt


Discharge Planning


Awaiting clinical improvement








Erik García DO Jun 14, 2017 09:03

## 2017-06-14 NOTE — RADRPT
EXAM DATE/TIME:  06/14/2017 04:25 

 

[facility] COMPARISON:     

CHEST SINGLE AP, June 13, 2017, 15:01.

 

                     

INDICATIONS :     

Shortness of breath. 

                     

 

MEDICAL HISTORY :            

Hypertension. Asthma   

 

SURGICAL HISTORY :     

None.   

 

ENCOUNTER:     

Subsequent                                        

 

ACUITY:     

2 days      

 

PAIN SCORE:     

0/10

 

LOCATION:     

Bilateral chest 

 

FINDINGS:     

There is slight cardiomegaly and perivascular pulmonary edema. Focal consolidation is not seen.

 

CONCLUSION:     Slight CHF.

 

 

 CESAR Lebron MD on June 14, 2017 at 5:27           

Board Certified Radiologist.

 This report was verified electronically.

## 2017-06-14 NOTE — ECHRPT
Indication:   Hypertensive Heart Disease

 

 CONCLUSIONS

 Normal left ventricular size. 

 Wall thickness is normal. 

 The left ventricular systolic function is borderline with an estimated ejection fraction of 50%. 

 The left atrial size is upper limits of normal. 

 Moderate mitral valve regurgitation. 

 Mild mitral annular calcification. 

 Aortic valve sclerosis is present. 

 Mild aortic valve regurgitation. 

 There is trace tricuspid valve regurgitation.

 Mild to moderate pulmonary hypertension.

 

 BP:  157   / 111     HR: 108                      Rhythm:           Atrial fibrillation

 

 MEASUREMENTS  (Male / Female) Normal Values       Technical Quality:Good

 2D ECHO

 LV Diastolic Diameter PLAX        4.4 cm                4.2 - 5.9 / 3.9 - 5.3 cm

 LV Systolic Diameter PLAX         3.4 cm                

 IVS Diastolic Thickness           0.9 cm                0.6 - 1.0 / 0.6 - 0.9 cm

 LVPW Diastolic Thickness          0.8 cm                0.6 - 1.0 / 0.6 - 0.9 cm

 LV Relative Wall Thickness        0.4                   

 RV Internal Dim ED PLAX           2.3 cm                

 LA Systolic Diameter LX           3.6 cm                3.0 - 4.0 / 2.7 - 3.8 cm

 

 M-MODE

 Aortic Root Diameter MM           3.2 cm                

 AV Cusp Separation MM             2.2 cm                

 

 DOPPLER

 AV Peak Velocity                  170.0 cm/s            

 AV Peak Gradient                  11.6 mmHg             

 LVOT Peak Velocity                93.8 cm/s             

 LVOT Peak Gradient                3.5 mmHg              

 MR Peak Velocity                  489.5 cm/s            

 MR Peak Gradient                  95.8 mmHg             

 Mitral E Point Velocity           69.6 cm/s             

 TR Peak Velocity                  343.0 cm/s            

 TR Peak Gradient                  47.1 mmHg             

 

 

 FINDINGS

 

 LEFT VENTRICLE

 Normal left ventricular size. 

 Wall thickness is normal. 

 The left ventricular systolic function is borderline with an estimated ejection fraction of 50%. 

 Left ventricular diastolic function parameters are normal. 

 

 RIGHT VENTRICLE

 Normal right ventricular size and systolic function.  

 

 LEFT ATRIUM

 The left atrial size is upper limits of normal. 

 

 RIGHT ATRIUM

 The right atrial size is normal.  

 

 ATRIAL SEPTUM

 Normal atrial septal thickness without atrial level shunting by limited color doppler interrogation.
  

 

 AORTA

 The aortic root and proximal ascending aorta are normal in size on limited imaging.  

 

 MITRAL VALVE

 Moderate mitral valve regurgitation. 

 Mild mitral annular calcification. 

 

 AORTIC VALVE

 Aortic valve sclerosis is present. 

 Mild aortic valve regurgitation. 

 

 TRICUSPID VALVE

 There is trace tricuspid valve regurgitation. 

 Mild to moderate pulmonary hypertension.

 

 PULMONARY VALVE

 The pulmonary valve is not well visualized.  

 

 VESSELS

 The inferior vena cava is normal in size.  

 

 PERICARDIUM

 No pericardial effusion.  

 

 

 

 

  Juana De Souza MD, FACC

  (Electronically Signed)

  Final Date:14 June 2017 13:22

  Amended:   14 June 2017 13:29

## 2017-06-15 VITALS
SYSTOLIC BLOOD PRESSURE: 113 MMHG | RESPIRATION RATE: 18 BRPM | TEMPERATURE: 97.5 F | DIASTOLIC BLOOD PRESSURE: 78 MMHG | OXYGEN SATURATION: 95 % | HEART RATE: 65 BPM

## 2017-06-15 VITALS
TEMPERATURE: 97.6 F | SYSTOLIC BLOOD PRESSURE: 141 MMHG | DIASTOLIC BLOOD PRESSURE: 72 MMHG | HEART RATE: 76 BPM | RESPIRATION RATE: 18 BRPM | OXYGEN SATURATION: 94 %

## 2017-06-15 VITALS
TEMPERATURE: 97.5 F | HEART RATE: 62 BPM | DIASTOLIC BLOOD PRESSURE: 77 MMHG | RESPIRATION RATE: 18 BRPM | OXYGEN SATURATION: 96 % | SYSTOLIC BLOOD PRESSURE: 108 MMHG

## 2017-06-15 VITALS — HEART RATE: 94 BPM

## 2017-06-15 VITALS
SYSTOLIC BLOOD PRESSURE: 106 MMHG | HEART RATE: 62 BPM | RESPIRATION RATE: 18 BRPM | OXYGEN SATURATION: 95 % | DIASTOLIC BLOOD PRESSURE: 78 MMHG | TEMPERATURE: 97.6 F

## 2017-06-15 VITALS
RESPIRATION RATE: 18 BRPM | HEART RATE: 43 BPM | SYSTOLIC BLOOD PRESSURE: 104 MMHG | DIASTOLIC BLOOD PRESSURE: 70 MMHG | TEMPERATURE: 97.7 F | OXYGEN SATURATION: 92 %

## 2017-06-15 VITALS — OXYGEN SATURATION: 95 %

## 2017-06-15 VITALS
HEART RATE: 91 BPM | RESPIRATION RATE: 18 BRPM | SYSTOLIC BLOOD PRESSURE: 142 MMHG | TEMPERATURE: 97.4 F | DIASTOLIC BLOOD PRESSURE: 90 MMHG | OXYGEN SATURATION: 94 %

## 2017-06-15 VITALS — HEART RATE: 76 BPM

## 2017-06-15 LAB
ANION GAP SERPL CALC-SCNC: 8 MEQ/L (ref 5–15)
APTT BLD: 102.1 SEC (ref 24.3–30.1)
APTT BLD: 59.8 SEC (ref 24.3–30.1)
BUN SERPL-MCNC: 16 MG/DL (ref 7–18)
CHLORIDE SERPL-SCNC: 108 MEQ/L (ref 98–107)
ERYTHROCYTE [DISTWIDTH] IN BLOOD BY AUTOMATED COUNT: 14.2 % (ref 11.6–17.2)
GFR SERPLBLD BASED ON 1.73 SQ M-ARVRAT: 86 ML/MIN (ref 89–?)
HCO3 BLD-SCNC: 24.8 MEQ/L (ref 21–32)
HCT VFR BLD CALC: 43.1 % (ref 39–51)
MAGNESIUM SERPL-MCNC: 1.9 MG/DL (ref 1.5–2.5)
MCH RBC QN AUTO: 28.8 PG (ref 27–34)
MCHC RBC AUTO-ENTMCNC: 32.1 % (ref 32–36)
MCV RBC AUTO: 89.7 FL (ref 80–100)
PLATELET # BLD: 197 TH/MM3 (ref 150–450)
POTASSIUM SERPL-SCNC: 3.9 MEQ/L (ref 3.5–5.1)
RBC # BLD AUTO: 4.8 MIL/MM3 (ref 4.5–5.9)
REVIEW FLAG: (no result)
SODIUM SERPL-SCNC: 141 MEQ/L (ref 136–145)
WBC # BLD AUTO: 8 TH/MM3 (ref 4–11)

## 2017-06-15 RX ADMIN — POTASSIUM CHLORIDE SCH MEQ: 750 TABLET, FILM COATED, EXTENDED RELEASE ORAL at 08:07

## 2017-06-15 RX ADMIN — FUROSEMIDE SCH MG: 20 TABLET ORAL at 08:07

## 2017-06-15 RX ADMIN — FOLIC ACID SCH MG: 1 TABLET ORAL at 08:08

## 2017-06-15 RX ADMIN — APIXABAN SCH MG: 5 TABLET, FILM COATED ORAL at 20:25

## 2017-06-15 RX ADMIN — ASPIRIN 81 MG SCH MG: 81 TABLET ORAL at 08:08

## 2017-06-15 RX ADMIN — METOPROLOL TARTRATE SCH MG: 50 TABLET, FILM COATED ORAL at 05:54

## 2017-06-15 RX ADMIN — METOPROLOL TARTRATE SCH MG: 50 TABLET, FILM COATED ORAL at 20:25

## 2017-06-15 RX ADMIN — APIXABAN SCH MG: 5 TABLET, FILM COATED ORAL at 11:40

## 2017-06-15 NOTE — PD.CARD.PN
Subjective


Subjective Remarks


Pt without complaints





Objective


Medications





 Current Medications








 Medications


  (Trade)  Dose


 Ordered  Sig/Desire


 Route  Start Time


 Stop Time Status Last Admin


 


 Sodium Chloride 2


 ml  2 ml  UNSCH  PRN


 IVF  6/13/17 15:00


     


 


 


  (Cardizem Inj/NS


 Inj)  125 ml @ 0


 mls/hr  TITRATE


 IV  6/13/17 15:00


    6/14/17 13:52


 


 


  (Heparin Inj)  5,000 units  UNSCH  PRN


 IV  6/13/17 21:30


     


 


 


 Heparin Sodium


  (Porcine) 2500


 units  2,500 units  UNSCH  PRN


 IV  6/13/17 21:30


     


 


 


  (Heparin-D5W Inj)  250 ml @ 0


 mls/hr  TITRATE


 IV  6/13/17 15:30


    6/14/17 21:46


 


 


  (Pill Splitter)  1 ea  UNSCH  PRN


 OTHER  6/13/17 16:45


     


 


 


  (Aspirin Chew)  81 mg  DAILY


 PO  6/14/17 09:00


    6/14/17 09:25


 


 


  (Lopressor)  50 mg  Q8HR


 PO  6/14/17 14:00


    6/15/17 05:54


 


 


  (Lasix)  20 mg  BID@09,18


 PO  6/14/17 09:00


    6/14/17 18:05


 


 


  (KCl)  10 meq  Q12HR


 PO  6/14/17 09:00


    6/14/17 20:22


 


 


  (Folate)  10 mg  DAILY


 PO  6/14/17 12:00


    6/14/17 13:10


 


 


  (Roxicodone)  5 mg  Q4H  PRN


 PO  6/14/17 14:30


    6/15/17 05:54


 








Vital Signs / I&O





 Vital Signs








  Date Time  Temp Pulse Resp B/P Pulse Ox O2 Delivery O2 Flow Rate FiO2


 


6/15/17 04:00 97.7 43 18 109/75 92   


 


6/15/17 04:00      Room Air  


 


6/15/17 04:00 97.7 80 20 104/70 93   


 


6/15/17 00:22  94      


 


6/15/17 00:00 97.5 65 18 113/78 95   


 


6/15/17 00:00      Room Air  


 


6/14/17 20:28  91      


 


6/14/17 20:00 97.6 48 18 135/83 95   


 


6/14/17 20:00      Room Air  


 


6/14/17 16:00  94      


 


6/14/17 16:00      Room Air  


 


6/14/17 16:00 97.9 84 18 104/70 98   


 


6/14/17 12:00 97.8 108 18 122/86 95   


 


6/14/17 12:00  103      


 


6/14/17 12:00      Room Air  


 


6/14/17 11:42  107      


 


6/14/17 10:09     94   








 I/O








 6/14/17 6/14/17 6/14/17 6/15/17 6/15/17 6/15/17





 07:00 15:00 23:00 07:00 15:00 23:00


 


Intake Total 869 ml 960 ml 177 ml 163 ml  


 


Output Total 1000 ml 1900 ml  2950 ml  


 


Balance -131 ml -940 ml 177 ml -2787 ml  


 


      


 


Intake Oral 480 ml 960 ml    


 


IV Total 289 ml  177 ml 163 ml  


 


Albumin 100 ml     


 


Output Urine Total 1000 ml 1900 ml  2950 ml  


 


# Bowel Movements 0 2    








Physical Exam


GENERAL: Well developed, well nourished. No acute distress.


HEENT: Jugular venous pressure is normal. 


CHEST: Lungs clear to auscultation bilaterally. Unlabored respiratory effort.


CARDIAC: irregular rate and rhythm without S3, S4, or murmur.


ABDOMEN: Soft, nontender, no hepatosplenomegaly. Bowel sounds present.


EXTREMITIES: No clubbing, cyanosis, or edema.


Laboratory





Laboratory Tests








Test 6/14/17





 09:24


 


White Blood Count 8.0 TH/MM3


 


Red Blood Count 4.74 MIL/MM3


 


Hemoglobin 13.5 GM/DL


 


Hematocrit 42.5 %


 


Mean Corpuscular Volume 89.7 FL


 


Mean Corpuscular Hemoglobin 28.5 PG


 


Mean Corpuscular Hemoglobin 31.8 %





Concent 


 


Red Cell Distribution Width 14.5 %


 


Platelet Count 224 TH/MM3


 


Mean Platelet Volume 10.3 FL


 


Activated Partial 70.3 SEC





Thromboplast Time 


 


Sodium Level 139 MEQ/L


 


Potassium Level 3.2 MEQ/L


 


Chloride Level 104 MEQ/L


 


Carbon Dioxide Level 22.5 MEQ/L


 


Anion Gap 13 MEQ/L


 


Blood Urea Nitrogen 12 MG/DL


 


Creatinine 0.87 MG/DL


 


Estimat Glomerular Filtration 108 ML/MIN





Rate 


 


Random Glucose 101 MG/DL


 


Hemoglobin A1c 5.9 %


 


Calcium Level 8.9 MG/DL


 


Triglycerides Level 52 MG/DL


 


Cholesterol Level 123 MG/DL


 


LDL Cholesterol 61 MG/DL


 


HDL Cholesterol 51.7 MG/DL


 


Cholesterol/HDL Ratio 2.37 RATIO











Assessment and Plan


Assessment and Plan


1. Atrial fibrillation - fair rate control, stop dilt gtt


   -CHADS VASC =2 so should have OP anticoagulation, consult case management to 

see if eliquis or pradaxa are on his formulary


   --awaiting case management





2. Hypertension - 


3. Indeterminate troponin - this is likely secondary to the atrial fibrillation


4. CHF-EF 50%


   - on BB








Bhumika Alonzo MD Bruce 15, 2017 08:11

## 2017-06-15 NOTE — HHI.PR
Subjective


Remarks


The patient said he got [employer] horses after receiving Lasix yesterday.  He 

said that he was having shortness of breath at times, especially with 

ambulation.  He was hoping to be able to go home soon.  Discussed with nursing.





Objective


Vitals





 Vital Signs








  Date Time  Temp Pulse Resp B/P Pulse Ox O2 Delivery O2 Flow Rate FiO2


 


6/15/17 08:30  76      


 


6/15/17 08:30      Room Air  


 


6/15/17 08:00 97.6 76 18 141/72 94   


 


6/15/17 04:00 97.7 43 18 109/75 92   


 


6/15/17 04:00      Room Air  


 


6/15/17 04:00 97.7 80 20 104/70 93   


 


6/15/17 00:22  94      


 


6/15/17 00:00 97.5 65 18 113/78 95   


 


6/15/17 00:00      Room Air  


 


6/14/17 20:28  91      


 


6/14/17 20:00 97.6 48 18 135/83 95   


 


6/14/17 20:00      Room Air  


 


6/14/17 16:00  94      


 


6/14/17 16:00      Room Air  


 


6/14/17 16:00 97.9 84 18 104/70 98   


 


6/14/17 12:00 97.8 108 18 122/86 95   


 


6/14/17 12:00  103      


 


6/14/17 12:00      Room Air  


 


6/14/17 11:42  107      








 I/O








 6/14/17 6/14/17 6/14/17 6/15/17 6/15/17 6/15/17





 07:00 15:00 23:00 07:00 15:00 23:00


 


Intake Total 869 ml 960 ml 177 ml 163 ml  


 


Output Total 1000 ml 1900 ml  2950 ml  


 


Balance -131 ml -940 ml 177 ml -2787 ml  


 


      


 


Intake Oral 480 ml 960 ml    


 


IV Total 289 ml  177 ml 163 ml  


 


Albumin 100 ml     


 


Output Urine Total 1000 ml 1900 ml  2950 ml  


 


# Bowel Movements 0 2    








Result Diagram:  


6/15/17 0745                                                                   

             6/15/17 0745





Imaging





Last Impressions








Chest X-Ray 6/14/17 0000 Signed





Impressions: 





 Service Date/Time:  Wednesday, June 14, 2017 04:25 - CONCLUSION: Slight CHF.  

  





 K. Cristofer Lebron MD 








Objective Remarks


GENERAL: Well-developed well-nourished.  In no acute distress.


SKIN: Warm and dry. No lesions noted.


HEENT: Normocephalic. Pupils equal and round. Mucous membranes pink and moist. 


CARDIOVASCULAR: Irregularly irregular.  No murmur appreciated.


RESPIRATORY: Crackles at the bases. 


GASTROINTESTINAL: Abdomen soft, non-tender, nondistended. Bowel sounds x4.


MUSCULOSKELETAL: No obvious deformities. No clubbing or cyanosis.  Trace 

pitting lower extremity edema. 


NEUROLOGICAL: Awake and alert. No focal neurological deficits.  Moves upper and 

lower extremities spontaneously. Normal speech.  Strength 5/5.


PSYCHIATRIC: Appropriate mood and affect; insight and judgment normal.


Medications and IVs





 Current Medications








 Medications


  (Trade)  Dose


 Ordered  Sig/Desire


 Route  Start Time


 Stop Time Status Last Admin


 


 Sodium Chloride 2


 ml  2 ml  UNSCH  PRN


 IVF  6/13/17 15:00


     


 


 


  (Cardizem Inj/NS


 Inj)  125 ml @ 0


 mls/hr  TITRATE


 IV  6/13/17 15:00


    6/14/17 13:52


 


 


  (Heparin Inj)  5,000 units  UNSCH  PRN


 IV  6/13/17 21:30


     


 


 


  (Heparin Inj)  2,500 units  UNSCH  PRN


 IV  6/13/17 21:30


     


 


 


  (Pill Splitter)  1 ea  UNSCH  PRN


 OTHER  6/13/17 16:45


     


 


 


  (Aspirin Chew)  81 mg  DAILY


 PO  6/14/17 09:00


    6/15/17 08:08


 


 


  (Folate)  10 mg  DAILY


 PO  6/14/17 12:00


    6/15/17 08:08


 


 


  (Roxicodone)  5 mg  Q4H  PRN


 PO  6/14/17 14:30


    6/15/17 09:01


 


 


  (Lopressor)  75 mg  Q12HR


 PO  6/15/17 21:00


     


 


 


  (Lasix Inj)  40 mg  ONCE  ONCE


 IV PUSH  6/15/17 11:30


 6/15/17 11:31   


 


 


  (KCl)  40 meq  ONCE  ONCE


 PO  6/15/17 11:30


 6/15/17 11:31   


 


 


  (Eliquis)  5 mg  BID


 PO  6/15/17 13:00


   UNV  


 











A/P


Assessment and Plan


61-year-old male with past medical history of HTN, RA, spinal stenosis, DJD, OA 

who presented for shortness of breath





New-onset atrial fibrillation with rapid ventricular response


Chadsvasc 2. EKG with A. fib with RVR, rate 120, no definite ischemic changes.  

Troponin peaked at 0.08. TSH WNL.  Cardiology consult appreciated.  Status post 

Cardizem drip.


- PO Lopressor increased by cardiology.


- continue ASA.


- d/c heparin gtt. Start Eliquis. Case management to get the pt a month free. 





Acute Systolic CHF


EF 50%, mod MR on echo. Likely s/t A fib. The patient developed shortness of 

breath and repeat chest x-ray showed evidence of CHF.  BNP is elevated over 

400.  He received Lasix and his symptoms improved.


- Continue cardiac regimen including Lasix.





Hypertension


Chronic, currently well controlled.  


- Hold home amlodipine and losartan.  


- Started metoprolol per cardiology.





PPx: Eliquis


Discharge Planning


Anticipate discharge in the morning if breathing better








Erik García DO Bruce 15, 2017 11:35

## 2017-06-16 VITALS
HEART RATE: 84 BPM | DIASTOLIC BLOOD PRESSURE: 86 MMHG | OXYGEN SATURATION: 96 % | SYSTOLIC BLOOD PRESSURE: 110 MMHG | RESPIRATION RATE: 16 BRPM | TEMPERATURE: 97.4 F

## 2017-06-16 VITALS
TEMPERATURE: 98 F | DIASTOLIC BLOOD PRESSURE: 75 MMHG | SYSTOLIC BLOOD PRESSURE: 120 MMHG | RESPIRATION RATE: 16 BRPM | OXYGEN SATURATION: 97 % | HEART RATE: 50 BPM

## 2017-06-16 VITALS — HEART RATE: 94 BPM

## 2017-06-16 VITALS
OXYGEN SATURATION: 95 % | HEART RATE: 55 BPM | TEMPERATURE: 97.4 F | SYSTOLIC BLOOD PRESSURE: 140 MMHG | DIASTOLIC BLOOD PRESSURE: 104 MMHG | RESPIRATION RATE: 20 BRPM

## 2017-06-16 VITALS — OXYGEN SATURATION: 97 %

## 2017-06-16 LAB
ANION GAP SERPL CALC-SCNC: 8 MEQ/L (ref 5–15)
APTT BLD: 26.9 SEC (ref 24.3–30.1)
BUN SERPL-MCNC: 19 MG/DL (ref 7–18)
CHLORIDE SERPL-SCNC: 107 MEQ/L (ref 98–107)
ERYTHROCYTE [DISTWIDTH] IN BLOOD BY AUTOMATED COUNT: 14.3 % (ref 11.6–17.2)
GFR SERPLBLD BASED ON 1.73 SQ M-ARVRAT: 79 ML/MIN (ref 89–?)
HCO3 BLD-SCNC: 26.8 MEQ/L (ref 21–32)
HCT VFR BLD CALC: 43.5 % (ref 39–51)
MAGNESIUM SERPL-MCNC: 2 MG/DL (ref 1.5–2.5)
MCH RBC QN AUTO: 29 PG (ref 27–34)
MCHC RBC AUTO-ENTMCNC: 32.3 % (ref 32–36)
MCV RBC AUTO: 89.6 FL (ref 80–100)
PLATELET # BLD: 216 TH/MM3 (ref 150–450)
POTASSIUM SERPL-SCNC: 3.7 MEQ/L (ref 3.5–5.1)
RBC # BLD AUTO: 4.85 MIL/MM3 (ref 4.5–5.9)
REVIEW FLAG: (no result)
SODIUM SERPL-SCNC: 142 MEQ/L (ref 136–145)
WBC # BLD AUTO: 6.1 TH/MM3 (ref 4–11)

## 2017-06-16 RX ADMIN — ASPIRIN 81 MG SCH MG: 81 TABLET ORAL at 08:42

## 2017-06-16 RX ADMIN — FOLIC ACID SCH MG: 1 TABLET ORAL at 08:41

## 2017-06-16 RX ADMIN — METOPROLOL TARTRATE SCH MG: 50 TABLET, FILM COATED ORAL at 08:42

## 2017-06-16 RX ADMIN — APIXABAN SCH MG: 5 TABLET, FILM COATED ORAL at 08:41

## 2017-06-16 NOTE — HHI.DS
__________________________________________________





Discharge Summary


Admission Date


Jun 13, 2017 at 16:18


Discharge Date:  Jun 16, 2017


Admitting Diagnosis


A. fib with RVR, shortness of breath





(1) Shortness of breath at rest


ICD Code:  R06.02


(2) Atrial fibrillation with RVR


ICD Code:  I48.91


Diagnosis:  Principal





(3) CHF (congestive heart failure)


ICD Code:  I50.9


Diagnosis:  Principal





Procedures


None


Brief History - From Admission


61-year-old male with past medical history of HTN, RA, spinal stenosis, DJD, OA 

who presented for shortness of breath.  The patient states that he's been 

having shortness breath and dyspnea with exertion for the past week.  He states 

that is been getting worse over the past 2 days.  He complains of palpitations 

and feeling like his heart is racing.  He denies any chest pain.  He denies 

ever experiencing any symptoms like this before.  He states someone mentioned 

about 30 years ago that he might have an irregular heartbeat.  He denies ever 

being told he has atrial fibrillation.  He's never seen a cardiologist.  He 

denies any recent illness, but has had a cough productive with white sputum for 

the last 3 days.  He denies any fever, chills, nausea, vomiting, diarrhea.


CBC/BMP:  


6/16/17 0613                                                                   

             6/16/17 0613





Significant Findings





Laboratory Tests








Test 6/13/17 6/13/17 6/14/17 6/15/17





 15:08 23:23 09:24 07:45


 


Mean Corpuscular Hemoglobin 31.8 %  31.8 % 





Concent (32.0-36.0)  (32.0-36.0) 


 


Chloride Level 109 MEQ/L   108 MEQ/L





 ()   ()


 


Estimat Glomerular Filtration 84 ML/MIN (>89)   86 ML/MIN (>89)





Rate    


 


Troponin I 0.08 NG/ML 0.07 NG/ML  





 (0.02-0.05) (0.02-0.05)  


 


B-Type Natriuretic Peptide 402 PG/ML   





 (0-100)   


 


Activated Partial  65.8 SEC 70.3 .1 SEC





Thromboplast Time  (24.3-30.1) (24.3-30.1) (24.3-30.1)


 


Potassium Level   3.2 MEQ/L 





   (3.5-5.1) 


 


Random Glucose    110 MG/DL





    ()


 


    





Test 6/15/17 6/16/17  





 10:06 06:13  


 


Activated Partial 59.8 SEC   





Thromboplast Time (24.3-30.1)   


 


Mean Platelet Volume  11.5 FL  





  (7.0-11.0)  


 


Blood Urea Nitrogen  19 MG/DL (7-18)  


 


Estimat Glomerular Filtration  79 ML/MIN (>89)  





Rate    








Imaging





Last Impressions








Chest X-Ray 6/14/17 0000 Signed





Impressions: 





 Service Date/Time:  Wednesday, June 14, 2017 04:25 - CONCLUSION: Slight CHF.  

  





 CESAR Lebron MD 








PE at Discharge


GENERAL: Well-developed well-nourished.  In no acute distress.


SKIN: Warm and dry. No lesions noted.


HEENT: Normocephalic. Pupils equal and round. Mucous membranes pink and moist. 


CARDIOVASCULAR: Irregularly irregular.  No murmur appreciated.


RESPIRATORY: CTAB. 


GASTROINTESTINAL: Abdomen soft, non-tender, nondistended. Bowel sounds x4.


MUSCULOSKELETAL: No obvious deformities. No clubbing or cyanosis.  No edema.


NEUROLOGICAL: Awake and alert. No focal neurological deficits.  Moves upper and 

lower extremities spontaneously. Normal speech.  Strength 5/5.


PSYCHIATRIC: Appropriate mood and affect; insight and judgment normal.


Pt update on day of discharge


The pt was feeling well and wanted to go home. No acute complaints. He said he 

would find a PCP in the next few weeks. Discussed with nursing and case 

management.


Hospital Course


New-onset atrial fibrillation with rapid ventricular response


Chadsvasc 2. EKG with A. fib with RVR, no definite ischemic changes.  Troponin 

peaked at 0.08. TSH WNL.  Cardiology was consulted. Status post Cardizem drip. 

He was continued on a heparin gtt. PO Lopressor was increased by cardiology. He 

was started on ASA but that was switched to Eliquis. Case management was 

consulted and will be able to get the pt a month free of Eliquis. He will 

follow up with his PCP and cardiology. 





Acute Systolic CHF


EF 50%, mod MR on echo. The patient developed shortness of breath and repeat 

chest x-ray showed evidence of CHF.  BNP is elevated over 400.  He received 

Lasix and his symptoms improved. He will be discharged on Lopressor and ASA. 





Hypertension


He will be discharged on Lopressor 75 mg BID and will follow up with cardiology.


Pt Condition on Discharge:  Stable


Discharge Disposition:  Discharge Home


Discharge Time:  > 30 minutes


Discharge Instructions


DIET: Follow Instructions for:  Heart Healthy Diet


Activities you can perform:  Weight Bearing as Pam


Follow up Referrals:  


Cardiology - 2 Weeks with Dr. Alonzo


PCP Follow-up - 1 Week





New Medications:  


Apixaban (Eliquis) 5 Mg Tab


5 MG PO BID A fib #60 TAB


Metoprolol Tartrate (Lopressor) 50 Mg Tab


75 MG PO Q12HR A fib #90 TAB


Oxycodone (Oxycodone) 5 Mg Tab


5 MG PO Q6HR PRN pain #14 TAB


 


Changed Medications:  


Folic Acid (Folic Acid) 20 Mg Cap


10 MG PO DAILY Vitamin #0 CAP (Changed from: 20 MG)


 


Continued Medications:  


Calcium Carbonate-Cholecalciferol (Oyster Shell Calcium + D) 500-400 Mg-Unit Tab


1 TAB PO DAILY TAB


Ibuprofen (Motrin Ib) 200 Mg Tablet


800 MG PO PRN PAIN SCALE 1 TO 5


Methotrexate (Methotrexate) 2.5 Mg Tab


20 MG PO Q7D Ref 0 TAB


 


Discontinued Medications:  


Amlodipine (Amlodipine) 5 Mg Tab


5 MG PO DAILY Blood Pressure Management Ref 0 TAB


Calcium Carbonate-Cholecalciferol (Os-Masood Calcium + D3) 500-200 Mg-Unit Tab


1 TAB PO DAILY Ref 0 TAB


Losartan (Losartan) 25 Mg Tab


12.5 MG PO DAILY Blood Pressure Management Ref 0 TAB











Erik García DO Jun 16, 2017 13:03

## 2017-06-16 NOTE — HHI.DCPOC
Discharge Care Plan


Diagnosis:  


(1) Atrial fibrillation with RVR


(2) Shortness of breath at rest


(3) CHF (congestive heart failure)


Goals to Promote Your Health


* To prevent worsening of your condition and complications


* To maintain your health at the optimal level


Directions to Meet Your Goals


*** Take your medications as prescribed


*** Follow your dietary instruction


*** Follow activity as directed








*** Keep your appointments as scheduled


*** Take your immunizations and boosters as scheduled


*** If your symptoms worsen call your PCP, if no PCP go to Urgent Care Center 

or Emergency Room***


*** Smoking is Dangerous to Your Health. Avoid second hand smoke***


***Call the 24-hour hour crisis hotline for domestic abuse at 1-263.597.7683***








Erik García DO Jun 16, 2017 12:53

## 2017-07-07 ENCOUNTER — HOSPITAL ENCOUNTER (EMERGENCY)
Dept: HOSPITAL 17 - NEPC | Age: 62
Discharge: HOME | End: 2017-07-07
Payer: MEDICAID

## 2017-07-07 VITALS — OXYGEN SATURATION: 98 % | RESPIRATION RATE: 18 BRPM

## 2017-07-07 VITALS
HEART RATE: 74 BPM | DIASTOLIC BLOOD PRESSURE: 106 MMHG | OXYGEN SATURATION: 97 % | TEMPERATURE: 97.7 F | SYSTOLIC BLOOD PRESSURE: 183 MMHG | RESPIRATION RATE: 36 BRPM

## 2017-07-07 VITALS — WEIGHT: 176.37 LBS | BODY MASS INDEX: 25.25 KG/M2 | HEIGHT: 70 IN

## 2017-07-07 DIAGNOSIS — F17.200: ICD-10-CM

## 2017-07-07 DIAGNOSIS — R06.02: ICD-10-CM

## 2017-07-07 DIAGNOSIS — I48.91: ICD-10-CM

## 2017-07-07 DIAGNOSIS — I10: ICD-10-CM

## 2017-07-07 DIAGNOSIS — Z79.899: ICD-10-CM

## 2017-07-07 DIAGNOSIS — J45.909: ICD-10-CM

## 2017-07-07 DIAGNOSIS — R60.9: ICD-10-CM

## 2017-07-07 DIAGNOSIS — M06.9: ICD-10-CM

## 2017-07-07 DIAGNOSIS — I50.9: Primary | ICD-10-CM

## 2017-07-07 LAB
ALP SERPL-CCNC: 114 U/L (ref 45–117)
ALT SERPL-CCNC: 53 U/L (ref 12–78)
ANION GAP SERPL CALC-SCNC: 8 MEQ/L (ref 5–15)
APTT BLD: 25.1 SEC (ref 24.3–30.1)
AST SERPL-CCNC: 54 U/L (ref 15–37)
BASOPHILS # BLD AUTO: 0.1 TH/MM3 (ref 0–0.2)
BASOPHILS NFR BLD: 0.8 % (ref 0–2)
BILIRUB SERPL-MCNC: 0.6 MG/DL (ref 0.2–1)
BUN SERPL-MCNC: 14 MG/DL (ref 7–18)
CHLORIDE SERPL-SCNC: 111 MEQ/L (ref 98–107)
CK SERPL-CCNC: 164 U/L (ref 39–308)
EOSINOPHIL # BLD: 0.1 TH/MM3 (ref 0–0.4)
EOSINOPHIL NFR BLD: 1.7 % (ref 0–4)
ERYTHROCYTE [DISTWIDTH] IN BLOOD BY AUTOMATED COUNT: 15.1 % (ref 11.6–17.2)
GFR SERPLBLD BASED ON 1.73 SQ M-ARVRAT: 82 ML/MIN (ref 89–?)
HCO3 BLD-SCNC: 24.1 MEQ/L (ref 21–32)
HCT VFR BLD CALC: 42.4 % (ref 39–51)
HEMO FLAGS: (no result)
INR PPP: 1 RATIO
LYMPHOCYTES # BLD AUTO: 1.8 TH/MM3 (ref 1–4.8)
LYMPHOCYTES NFR BLD AUTO: 24.8 % (ref 9–44)
MCH RBC QN AUTO: 28.6 PG (ref 27–34)
MCHC RBC AUTO-ENTMCNC: 32.2 % (ref 32–36)
MCV RBC AUTO: 88.9 FL (ref 80–100)
MONOCYTES NFR BLD: 11 % (ref 0–8)
NEUTROPHILS # BLD AUTO: 4.6 TH/MM3 (ref 1.8–7.7)
NEUTROPHILS NFR BLD AUTO: 61.7 % (ref 16–70)
PLATELET # BLD: 202 TH/MM3 (ref 150–450)
POTASSIUM SERPL-SCNC: 3.8 MEQ/L (ref 3.5–5.1)
PROTHROMBIN TIME: 11.5 SEC (ref 9.8–11.6)
RBC # BLD AUTO: 4.77 MIL/MM3 (ref 4.5–5.9)
SODIUM SERPL-SCNC: 143 MEQ/L (ref 136–145)
WBC # BLD AUTO: 7.4 TH/MM3 (ref 4–11)

## 2017-07-07 PROCEDURE — 96374 THER/PROPH/DIAG INJ IV PUSH: CPT

## 2017-07-07 PROCEDURE — 80053 COMPREHEN METABOLIC PANEL: CPT

## 2017-07-07 PROCEDURE — 85730 THROMBOPLASTIN TIME PARTIAL: CPT

## 2017-07-07 PROCEDURE — 83880 ASSAY OF NATRIURETIC PEPTIDE: CPT

## 2017-07-07 PROCEDURE — 82550 ASSAY OF CK (CPK): CPT

## 2017-07-07 PROCEDURE — 84443 ASSAY THYROID STIM HORMONE: CPT

## 2017-07-07 PROCEDURE — 85610 PROTHROMBIN TIME: CPT

## 2017-07-07 PROCEDURE — 93005 ELECTROCARDIOGRAM TRACING: CPT

## 2017-07-07 PROCEDURE — 99285 EMERGENCY DEPT VISIT HI MDM: CPT

## 2017-07-07 PROCEDURE — 85025 COMPLETE CBC W/AUTO DIFF WBC: CPT

## 2017-07-07 PROCEDURE — 71010: CPT

## 2017-07-07 PROCEDURE — 84484 ASSAY OF TROPONIN QUANT: CPT

## 2017-07-07 NOTE — RADRPT
EXAM DATE/TIME:  07/07/2017 13:58 

 

[facility] COMPARISON:     

CHEST SINGLE AP, June 13, 2017, 15:01.  CHEST SINGLE AP, June 14, 2017, 4:25.

 

                     

INDICATIONS :     

Short of breath with weakness x1 week, getting progressively worse. History of A-fib.

                     

 

MEDICAL HISTORY :            

A-fib   

 

SURGICAL HISTORY :     

None.   

 

ENCOUNTER:     

Initial                                        

 

ACUITY:     

1 week      

 

PAIN SCORE:     

0/10

 

LOCATION:     

Bilateral chest 

 

FINDINGS:     

There has been improvement in the previously noted pulmonary edema. There is improved aeration of the
 lung fields. There is some platelike atelectasis in the left midlung. No definite pleural effusions.
 The heart size is stable. The bony structures are stable.

 

CONCLUSION:     

There has been improvement in the mild pulmonary edema which was noted on the prior study. Platelike 
atelectasis in the left midlung.

 

 

 

 Julian Arriaga MD on July 07, 2017 at 14:17           

Board Certified Radiologist.

 This report was verified electronically.

## 2017-07-07 NOTE — PD
Physical Exam


Time Seen by Provider:  13:33


Narrative


62yo M c/o epigastric pain, SOB, and feeling same symptoms of Afib x 3 weeks.  

Denies chest pain.  Reports heart palpitations.  Reports fatigue.  





Patient seen in triage.  VS reviewed.  Awaiting bed placement.





Data


Data


Last Documented VS





Vital Signs








  Date Time  Temp Pulse Resp B/P Pulse Ox O2 Delivery O2 Flow Rate FiO2


 


7/7/17 13:28 97.7 74 36 183/106 97 Room Air  











[facility]


Supervised Visit with LIANET:  Ofelia Lopez Jul 7, 2017 13:35

## 2017-07-07 NOTE — PD
HPI


Chief Complaint:  Cardiac Complaint


Time Seen by Provider:  13:48


Travel History


International Travel<30 days:  No


Contact w/Intl Traveler<30days:  No


Traveled to known affect area:  No





History of Present Illness


HPI


61-year-old male complains of shortness of breath and generalized malaise.  

Patient was admitted to [facility] June 13 and discharged June 16 with 

diagnosis of new onset atrial fibrillation, systolic CHF and hypertension.  

Patient was discharged home with prescription for Eliquis, Lopressor, oxycodone

, methotrexate and ibuprofen.  Patient states that he has persistent shortness 

of breath, dyspnea on exertion since discharge.  Patient states that he has 

increasing swelling of the lower extremity since discharge.  Patient denies any 

headache.  Patient denies any chest pain.  Patient denies abdominal pain.  

Patient denies any fever chills.





PFSH


Past Medical History


Arthritis:  Yes (RA , OSTEOARTHRITIS)


Asthma:  Yes


Heart Rhythm Problems:  Yes (IRREGULAR HEART RYTHUM )


Cancer:  No


Cardiovascular Problems:  Yes


High Cholesterol:  No


Diminished Hearing:  No


Endocrine:  No


Gastrointestinal Disorders:  No


Genitourinary:  No


Hypertension:  Yes


Immune Disorder:  No


Implanted Vascular Access Dvce:  No


Musculoskeletal:  Yes (SPINAL STENOSIS, DEGENERATIVE JOINT DISEASE )


Neurologic:  No


Psychiatric:  No


Reproductive:  No


Respiratory:  Yes





Past Surgical History


Other Surgery:  Yes





Social History


Alcohol Use:  Yes ("COUPLE DRINKS PER WEEK")


Tobacco Use:  Yes (0.5 ppd)


Substance Use:  No





Allergies-Medications


(Allergen,Severity, Reaction):  


Coded Allergies:  


     No Known Allergies (Unverified , 6/13/17)


Reported Meds & Prescriptions





Reported Meds & Active Scripts


Active


Lopressor (Metoprolol Tartrate) 50 Mg Tab 75 Mg PO Q12HR


Eliquis (Apixaban) 5 Mg Tab 5 Mg PO BID


Reported


Clotrimazole Topical (Clotrimazole) 1% Cream 1 Applic TOPICAL BID


Folic Acid 1 Mg Tablet 1 Mg PO DAILY


Ibuprofen 800 Mg Tab 800 Mg PO Q8H PRN


Oyster Shell Calcium + D (Calcium Carbonate-Cholecalciferol) 500-400 Mg-Unit 

Tab 1 Tab PO DAILY


Methotrexate 2.5 Mg Tab 20 Mg PO Q7D


     Saturdays








Review of Systems


General / Constitutional:  No: Fever


Eyes:  No: Visual changes


HENT:  No: Headaches


Cardiovascular:  No: Chest Pain or Discomfort


Respiratory:  Positive: Shortness of Breath


Gastrointestinal:  No: Abdominal Pain


Genitourinary:  No: Dysuria


Musculoskeletal:  Positive: Edema,  No: Pain


Skin:  No Rash


Neurologic:  No: Weakness


Psychiatric:  No: Depression


Endocrine:  No: Polydipsia


Hematologic/Lymphatic:  No: Easy Bruising





Physical Exam


Narrative


GENERAL: Well-nourished, well-developed patient.


SKIN: Focused skin assessment warm/dry.


HEAD: Normocephalic.


EYES: No scleral icterus. No injection or drainage. 


NECK: Supple, trachea midline. No JVD or lymphadenopathy.


CARDIOVASCULAR: Regular rate and rhythm without murmurs, gallops, or rubs. 


RESPIRATORY: Breath sounds equal bilaterally. No accessory muscle use.


GASTROINTESTINAL: Abdomen soft, non-tender, nondistended. 


MUSCULOSKELETAL: Patient has +1 to +2 pitting edema lower extremity.


BACK: Nontender without obvious deformity. No CVA tenderness.


Neurologic exam normal.





Data


Data


Last Documented VS





Vital Signs








  Date Time  Temp Pulse Resp B/P Pulse Ox O2 Delivery O2 Flow Rate FiO2


 


7/7/17 13:39  96 18   Room Air  


 


7/7/17 13:28 97.7   183/106 97   








Orders





 Complete Blood Count With Diff (7/7/17 13:57)


Comprehensive Metabolic Panel (7/7/17 13:57)


Creatine Kinase (Cpk) (7/7/17 13:57)


Troponin I (7/7/17 13:57)


B-Type Natriuretic Peptide (7/7/17 13:57)


Prothrombin Time / Inr (Pt) (7/7/17 13:57)


Act Partial Throm Time (Ptt) (7/7/17 13:57)


Thyroid Stimulating Hormone (7/7/17 13:57)


Chest, Single Ap (7/7/17 13:57)


Iv Access Insert/Monitor (7/7/17 13:57)


Ecg Monitoring (7/7/17 13:57)


Oximetry (7/7/17 13:57)


Furosemide Inj (Lasix Inj) (7/7/17 14:00)


Electrocardiogram (7/7/17 13:39)





Labs





 Laboratory Tests








Test 7/7/17





 14:06


 


White Blood Count 7.4 TH/MM3


 


Red Blood Count 4.77 MIL/MM3


 


Hemoglobin 13.6 GM/DL


 


Hematocrit 42.4 %


 


Mean Corpuscular Volume 88.9 FL


 


Mean Corpuscular Hemoglobin 28.6 PG


 


Mean Corpuscular Hemoglobin 32.2 %





Concent 


 


Red Cell Distribution Width 15.1 %


 


Platelet Count 202 TH/MM3


 


Mean Platelet Volume 10.9 FL


 


Neutrophils (%) (Auto) 61.7 %


 


Lymphocytes (%) (Auto) 24.8 %


 


Monocytes (%) (Auto) 11.0 %


 


Eosinophils (%) (Auto) 1.7 %


 


Basophils (%) (Auto) 0.8 %


 


Neutrophils # (Auto) 4.6 TH/MM3


 


Lymphocytes # (Auto) 1.8 TH/MM3


 


Monocytes # (Auto) 0.8 TH/MM3


 


Eosinophils # (Auto) 0.1 TH/MM3


 


Basophils # (Auto) 0.1 TH/MM3


 


CBC Comment DIFF FINAL 


 


Differential Comment  


 


Prothrombin Time 11.5 SEC


 


Prothromb Time International 1.0 RATIO





Ratio 


 


Activated Partial 25.1 SEC





Thromboplast Time 


 


Sodium Level 143 MEQ/L


 


Potassium Level 3.8 MEQ/L


 


Chloride Level 111 MEQ/L


 


Carbon Dioxide Level 24.1 MEQ/L


 


Anion Gap 8 MEQ/L


 


Blood Urea Nitrogen 14 MG/DL


 


Creatinine 1.11 MG/DL


 


Estimat Glomerular Filtration 82 ML/MIN





Rate 


 


Random Glucose 74 MG/DL


 


Calcium Level 8.2 MG/DL


 


Aspartate Amino Transf 54 U/L





(AST/SGOT) 


 


Alanine Aminotransferase 53 U/L





(ALT/SGPT) 


 


B-Type Natriuretic Peptide 1132 PG/ML


 


Albumin 3.2 GM/DL











MDM


Medical Decision Making


Medical Screen Exam Complete:  Yes


Emergency Medical Condition:  Yes


Interpretation(s)





Last Impressions








Chest X-Ray 7/7/17 1357 Signed





Impressions: 





 Service Date/Time:  Friday, July 7, 2017 13:58 - CONCLUSION:  There has been 





 improvement in the mild pulmonary edema which was noted on the prior study. 





 Platelike atelectasis in the left midlung.     Julian Arriaga MD 





1507 p.m.  CBC within normal limit.  CMP within normal limit.  BNP 1132.  

Troponin 0.08.


Differential Diagnosis


Differential diagnosis including exacerbation CHF breath, bronchitis, pneumonia

, PE, pneumothorax.


Narrative Course


61-year-old male with shortness of breath and lower extremity edema.  History 

of CHF and atrial fibrillation.  Lasix 40 mg IV given.





Diagnosis





 Primary Impression:  


 Acute exacerbation of CHF (congestive heart failure)


 Qualified Code:  I50.23 - Acute on chronic systolic congestive heart failure


Patient Instructions:  General Instructions





***Additional Instructions:


Lasix and potassium as directed.  Continue with all other medications.  Follow-

up with personal physician.  Return if worse.


***Med/Other Pt SpecificInfo:  Prescription(s) given


Scripts


Potassium Chloride ER 10 Meq Cap10 Meq PO DAILY  #30 CAP  Ref 0


   Prov:Arley Rodriguez MD         7/7/17 


Furosemide (Lasix)20 Mg Tab20 Mg PO DAILY  #30 TAB  Ref 0


   Prov:Arley Rodriguez MD         7/7/17


Disposition:  01 DISCHARGE HOME


Condition:  Stable








Arley Rodriguez MD Jul 7, 2017 15:16

## 2017-07-08 NOTE — EKG
Date Performed: 07/07/2017       Time Performed: 13:39:30

 

PTAGE:      61 years

 

EKG:      Sinus rhythm 

 

 WITH FREQUENT SUPRAVENTRICULAR PREMATURE COMPLEXES MODERATE T-WAVE ABNORMALITY, CONSIDER ANTEROLATER
AL ISCHEMIA ABNORMAL ECG INTERPRETATION BASED ON A DEFAULT AGE OF 40 YEARS 

 

 PREVIOUS TRACING            : 06/13/2017 14.48 Compared to the previous tracing, previously multi-fo
alexandre atrial tachycardia

 

DOCTOR:   Rahul Schmidt  Interpretating Date/Time  07/08/2017 16:52:53

## 2017-12-30 ENCOUNTER — HOSPITAL ENCOUNTER (EMERGENCY)
Dept: HOSPITAL 17 - NEPD | Age: 62
Discharge: HOME | End: 2017-12-30
Payer: MEDICAID

## 2017-12-30 VITALS — HEIGHT: 67 IN | BODY MASS INDEX: 31.83 KG/M2 | WEIGHT: 202.83 LBS

## 2017-12-30 VITALS
RESPIRATION RATE: 16 BRPM | TEMPERATURE: 97.7 F | OXYGEN SATURATION: 100 % | DIASTOLIC BLOOD PRESSURE: 70 MMHG | HEART RATE: 72 BPM | SYSTOLIC BLOOD PRESSURE: 99 MMHG

## 2017-12-30 VITALS
HEART RATE: 53 BPM | DIASTOLIC BLOOD PRESSURE: 92 MMHG | RESPIRATION RATE: 18 BRPM | OXYGEN SATURATION: 96 % | SYSTOLIC BLOOD PRESSURE: 134 MMHG

## 2017-12-30 DIAGNOSIS — M70.52: Primary | ICD-10-CM

## 2017-12-30 LAB
BASOPHILS # BLD AUTO: 0.1 TH/MM3 (ref 0–0.2)
BASOPHILS NFR BLD: 0.5 % (ref 0–2)
BUN SERPL-MCNC: 15 MG/DL (ref 7–18)
CALCIUM SERPL-MCNC: 9.5 MG/DL (ref 8.5–10.1)
CHLORIDE SERPL-SCNC: 98 MEQ/L (ref 98–107)
CREAT SERPL-MCNC: 1.38 MG/DL (ref 0.6–1.3)
EOSINOPHIL # BLD: 0 TH/MM3 (ref 0–0.4)
EOSINOPHIL NFR BLD: 0.3 % (ref 0–4)
ERYTHROCYTE [DISTWIDTH] IN BLOOD BY AUTOMATED COUNT: 14.9 % (ref 11.6–17.2)
GFR SERPLBLD BASED ON 1.73 SQ M-ARVRAT: 63 ML/MIN (ref 89–?)
GLUCOSE SERPL-MCNC: 78 MG/DL (ref 74–106)
HCO3 BLD-SCNC: 27.1 MEQ/L (ref 21–32)
HCT VFR BLD CALC: 40.5 % (ref 39–51)
HGB BLD-MCNC: 14.3 GM/DL (ref 13–17)
LYMPHOCYTES # BLD AUTO: 1.6 TH/MM3 (ref 1–4.8)
LYMPHOCYTES NFR BLD AUTO: 11.1 % (ref 9–44)
MCH RBC QN AUTO: 33.6 PG (ref 27–34)
MCHC RBC AUTO-ENTMCNC: 35.4 % (ref 32–36)
MCV RBC AUTO: 95.1 FL (ref 80–100)
MONOCYTE #: 2.2 TH/MM3 (ref 0–0.9)
MONOCYTES NFR BLD: 15 % (ref 0–8)
NEUTROPHILS # BLD AUTO: 10.5 TH/MM3 (ref 1.8–7.7)
NEUTROPHILS NFR BLD AUTO: 73.1 % (ref 16–70)
PLATELET # BLD: 266 TH/MM3 (ref 150–450)
PMV BLD AUTO: 8.6 FL (ref 7–11)
RBC # BLD AUTO: 4.26 MIL/MM3 (ref 4.5–5.9)
SODIUM SERPL-SCNC: 136 MEQ/L (ref 136–145)
WBC # BLD AUTO: 14.4 TH/MM3 (ref 4–11)

## 2017-12-30 PROCEDURE — 89060 EXAM SYNOVIAL FLUID CRYSTALS: CPT

## 2017-12-30 PROCEDURE — 73564 X-RAY EXAM KNEE 4 OR MORE: CPT

## 2017-12-30 PROCEDURE — 99284 EMERGENCY DEPT VISIT MOD MDM: CPT

## 2017-12-30 PROCEDURE — 87205 SMEAR GRAM STAIN: CPT

## 2017-12-30 PROCEDURE — 87070 CULTURE OTHR SPECIMN AEROBIC: CPT

## 2017-12-30 PROCEDURE — 96375 TX/PRO/DX INJ NEW DRUG ADDON: CPT

## 2017-12-30 PROCEDURE — 89051 BODY FLUID CELL COUNT: CPT

## 2017-12-30 PROCEDURE — 85025 COMPLETE CBC W/AUTO DIFF WBC: CPT

## 2017-12-30 PROCEDURE — 96374 THER/PROPH/DIAG INJ IV PUSH: CPT

## 2017-12-30 PROCEDURE — 80048 BASIC METABOLIC PNL TOTAL CA: CPT

## 2017-12-30 PROCEDURE — 20610 DRAIN/INJ JOINT/BURSA W/O US: CPT

## 2017-12-30 NOTE — PD
Physical Exam


Date Seen by Provider:  Dec 30, 2017





Data


Data


Last Documented VS





Vital Signs








  Date Time  Temp Pulse Resp B/P (MAP) Pulse Ox O2 Delivery O2 Flow Rate FiO2


 


12/30/17 19:20  53 18 134/92 (106) 96 Room Air  


 


12/30/17 14:04 97.7       








Orders





 Orders


Lidocaine 1% Inj (Xylocaine 1% Inj) (12/30/17 14:15)


Knee, Complete (4vws) (12/30/17 )


Fluid Culture And Gram Stain (12/30/17 14:13)


Synovial Fluid Crystals (12/30/17 14:13)


Synovial Fl Cell Count + Diff (12/30/17 14:13)


Lidocai-Epi 1%-1:100,000 Inj (Xylocaine- (12/30/17 14:45)


Basic Metabolic Panel (Bmp) (12/30/17 15:26)


Complete Blood Count With Diff (12/30/17 15:26)


Iv Access Insert/Monitor (12/30/17 15:26)


Hydromorphone Pf Inj (Dilaudid Pf Inj) (12/30/17 15:30)


Ondansetron  Odt (Zofran  Odt) (12/30/17 15:30)


Ketorolac Inj (Toradol Inj) (12/30/17 19:30)





Labs





Laboratory Tests








Test


  12/30/17


15:26 12/30/17


15:30 12/30/17


17:55


 


Blood Urea Nitrogen 15 MG/DL   


 


Creatinine 1.38 MG/DL   


 


Random Glucose 78 MG/DL   


 


Calcium Level 9.5 MG/DL   


 


Sodium Level 136 MEQ/L   


 


Potassium Level 4.2 MEQ/L   


 


Chloride Level 98 MEQ/L   


 


Carbon Dioxide Level 27.1 MEQ/L   


 


Anion Gap 11 MEQ/L   


 


Estimat Glomerular Filtration


Rate 63 ML/MIN 


  


  


 


 


Synovial Fluid Color  STRAW  


 


Synovial Fluid Appearance  SLIGHT  


 


Synovial Fluid WBC  4785 /MM3  


 


Synovial Fluid RBC  60 /MM3  


 


Synovial Fluid Neutrophils  90 %  


 


Synovial Fluid Lymphocytes  3 %  


 


Synovial Fluid Monocytes  6 %  


 


Synovial Fluid Synovial


Lining Cell 


  1 % 


  


 


 


Synovial Fluid Crystals  NONE  


 


White Blood Count   14.4 TH/MM3 


 


Red Blood Count   4.26 MIL/MM3 


 


Hemoglobin   14.3 GM/DL 


 


Hematocrit   40.5 % 


 


Mean Corpuscular Volume   95.1 FL 


 


Mean Corpuscular Hemoglobin   33.6 PG 


 


Mean Corpuscular Hemoglobin


Concent 


  


  35.4 % 


 


 


Red Cell Distribution Width   14.9 % 


 


Platelet Count   266 TH/MM3 


 


Mean Platelet Volume   8.6 FL 


 


Neutrophils (%) (Auto)   73.1 % 


 


Lymphocytes (%) (Auto)   11.1 % 


 


Monocytes (%) (Auto)   15.0 % 


 


Eosinophils (%) (Auto)   0.3 % 


 


Basophils (%) (Auto)   0.5 % 


 


Neutrophils # (Auto)   10.5 TH/MM3 


 


Lymphocytes # (Auto)   1.6 TH/MM3 


 


Monocytes # (Auto)   2.2 TH/MM3 


 


Eosinophils # (Auto)   0.0 TH/MM3 


 


Basophils # (Auto)   0.1 TH/MM3 


 


CBC Comment   AUTO DIFF 


 


Differential Comment


  


  


  AUTO DIFF


CONFIRMED











MDM


Medical Record Reviewed:  Yes


Supervised Visit with LIANET:  No


Interpretation(s)





Vital Signs








  Date Time  Temp Pulse Resp B/P (MAP) Pulse Ox O2 Delivery O2 Flow Rate FiO2


 


12/30/17 19:20  53 18 134/92 (106) 96 Room Air  


 


12/30/17 14:04 97.7 72 16 99/70 (80) 100   





CBC & BMP Diagram


12/30/17 15:26








Calcium Level 9.5





12/30/17 17:55











Laboratory Tests








Test


  12/30/17


15:26 12/30/17


15:30 12/30/17


17:55


 


Blood Urea Nitrogen


  15 MG/DL


(7-18) 


  


 


 


Creatinine


  1.38 MG/DL


(0.60-1.30) 


  


 


 


Random Glucose


  78 MG/DL


() 


  


 


 


Calcium Level


  9.5 MG/DL


(8.5-10.1) 


  


 


 


Sodium Level


  136 MEQ/L


(136-145) 


  


 


 


Potassium Level


  4.2 MEQ/L


(3.5-5.1) 


  


 


 


Chloride Level


  98 MEQ/L


() 


  


 


 


Carbon Dioxide Level


  27.1 MEQ/L


(21.0-32.0) 


  


 


 


Anion Gap


  11 MEQ/L


(5-15) 


  


 


 


Estimat Glomerular Filtration


Rate 63 ML/MIN


(>89) 


  


 


 


Synovial Fluid Color  STRAW (STRAW)  


 


Synovial Fluid Appearance  SLIGHT (CLEAR)  


 


Synovial Fluid WBC


  


  4785 /MM3


(0-200) 


 


 


Synovial Fluid RBC  60 /MM3 (0-0)  


 


Synovial Fluid Neutrophils  90 % (0-25)  


 


Synovial Fluid Lymphocytes  3 %  


 


Synovial Fluid Monocytes  6 %  


 


Synovial Fluid Synovial


Lining Cell 


  1 % 


  


 


 


Synovial Fluid Crystals  NONE (NONE)  


 


White Blood Count


  


  


  14.4 TH/MM3


(4.0-11.0)


 


Red Blood Count


  


  


  4.26 MIL/MM3


(4.50-5.90)


 


Hemoglobin


  


  


  14.3 GM/DL


(13.0-17.0)


 


Hematocrit


  


  


  40.5 %


(39.0-51.0)


 


Mean Corpuscular Volume


  


  


  95.1 FL


(80.0-100.0)


 


Mean Corpuscular Hemoglobin


  


  


  33.6 PG


(27.0-34.0)


 


Mean Corpuscular Hemoglobin


Concent 


  


  35.4 %


(32.0-36.0)


 


Red Cell Distribution Width


  


  


  14.9 %


(11.6-17.2)


 


Platelet Count


  


  


  266 TH/MM3


(150-450)


 


Mean Platelet Volume


  


  


  8.6 FL


(7.0-11.0)


 


Neutrophils (%) (Auto)


  


  


  73.1 %


(16.0-70.0)


 


Lymphocytes (%) (Auto)


  


  


  11.1 %


(9.0-44.0)


 


Monocytes (%) (Auto)


  


  


  15.0 %


(0.0-8.0)


 


Eosinophils (%) (Auto)


  


  


  0.3 %


(0.0-4.0)


 


Basophils (%) (Auto)


  


  


  0.5 %


(0.0-2.0)


 


Neutrophils # (Auto)


  


  


  10.5 TH/MM3


(1.8-7.7)


 


Lymphocytes # (Auto)


  


  


  1.6 TH/MM3


(1.0-4.8)


 


Monocytes # (Auto)


  


  


  2.2 TH/MM3


(0-0.9)


 


Eosinophils # (Auto)


  


  


  0.0 TH/MM3


(0-0.4)


 


Basophils # (Auto)


  


  


  0.1 TH/MM3


(0-0.2)


 


CBC Comment   AUTO DIFF 


 


Differential Comment


  


  


  AUTO DIFF


CONFIRMED








Differential Diagnosis


inflammatory vs septic joint


Narrative Course


Patient signed out to me by Dr. smith. Patient pending joint aspiration and 

ultimate dispo of patient








Laboratory Tests








Test


  12/30/17


15:26 12/30/17


15:30 12/30/17


17:55


 


Blood Urea Nitrogen


  15 MG/DL


(7-18) 


  


 


 


Creatinine


  1.38 MG/DL


(0.60-1.30) 


  


 


 


Random Glucose


  78 MG/DL


() 


  


 


 


Calcium Level


  9.5 MG/DL


(8.5-10.1) 


  


 


 


Sodium Level


  136 MEQ/L


(136-145) 


  


 


 


Potassium Level


  4.2 MEQ/L


(3.5-5.1) 


  


 


 


Chloride Level


  98 MEQ/L


() 


  


 


 


Carbon Dioxide Level


  27.1 MEQ/L


(21.0-32.0) 


  


 


 


Anion Gap


  11 MEQ/L


(5-15) 


  


 


 


Estimat Glomerular Filtration


Rate 63 ML/MIN


(>89) 


  


 


 


Synovial Fluid Color  STRAW (STRAW)  


 


Synovial Fluid Appearance  SLIGHT (CLEAR)  


 


Synovial Fluid WBC


  


  4785 /MM3


(0-200) 


 


 


Synovial Fluid RBC  60 /MM3 (0-0)  


 


Synovial Fluid Neutrophils  90 % (0-25)  


 


Synovial Fluid Lymphocytes  3 %  


 


Synovial Fluid Monocytes  6 %  


 


Synovial Fluid Synovial


Lining Cell 


  1 % 


  


 


 


Synovial Fluid Crystals  NONE (NONE)  


 


White Blood Count


  


  


  14.4 TH/MM3


(4.0-11.0)


 


Red Blood Count


  


  


  4.26 MIL/MM3


(4.50-5.90)


 


Hemoglobin


  


  


  14.3 GM/DL


(13.0-17.0)


 


Hematocrit


  


  


  40.5 %


(39.0-51.0)


 


Mean Corpuscular Volume


  


  


  95.1 FL


(80.0-100.0)


 


Mean Corpuscular Hemoglobin


  


  


  33.6 PG


(27.0-34.0)


 


Mean Corpuscular Hemoglobin


Concent 


  


  35.4 %


(32.0-36.0)


 


Red Cell Distribution Width


  


  


  14.9 %


(11.6-17.2)


 


Platelet Count


  


  


  266 TH/MM3


(150-450)


 


Mean Platelet Volume


  


  


  8.6 FL


(7.0-11.0)


 


Neutrophils (%) (Auto)


  


  


  73.1 %


(16.0-70.0)


 


Lymphocytes (%) (Auto)


  


  


  11.1 %


(9.0-44.0)


 


Monocytes (%) (Auto)


  


  


  15.0 %


(0.0-8.0)


 


Eosinophils (%) (Auto)


  


  


  0.3 %


(0.0-4.0)


 


Basophils (%) (Auto)


  


  


  0.5 %


(0.0-2.0)


 


Neutrophils # (Auto)


  


  


  10.5 TH/MM3


(1.8-7.7)


 


Lymphocytes # (Auto)


  


  


  1.6 TH/MM3


(1.0-4.8)


 


Monocytes # (Auto)


  


  


  2.2 TH/MM3


(0-0.9)


 


Eosinophils # (Auto)


  


  


  0.0 TH/MM3


(0-0.4)


 


Basophils # (Auto)


  


  


  0.1 TH/MM3


(0-0.2)


 


CBC Comment   AUTO DIFF 


 


Differential Comment


  


  


  AUTO DIFF


CONFIRMED





wbc 14.4, hemoglobin 14.3, hematocrit 40.5


Synovial fluid with 4,785 wbc, 60rbc, 90 neutrophils, no crystals





Patient with most likely inflammatory synovial fluid vs septic joint. 





Gram stain is pending





Plan to start on bactrim for possible underlying cellulitis, gram stain pending





Patient will return to ER in 48 hours for re-evaluation





Signs and symptoms of when to return to the ER was reviewed with patient in 

detail.


Diagnosis





 Primary Impression:  


 Inflammation of bursa


 Qualified Codes:  M70.52 - Other bursitis of knee, left knee


 Additional Impression:  


 Cellulitis


Referrals:  


Dain Moncada Jr., MD


Patient Instructions:  General Instructions





***Additional Instruction:  


Please place ice and elevate your leg





Please follow up with your primary care doctor in 2-3 days





Return to the ER if symptoms worsen or progress or if you have develop any fever

/chills





Return to the ER as needed





Take all antibiotics as prescribed





Return to ER in 48 hours for re-evaluation of your knee


***Med/Other Pt SpecificInfo:  Prescription(s) given


Scripts


Sulfamethoxazole-Trimethoprim (Bactrim DS) 800-160 Mg Tab


1 TAB PO BID for Infection for 10 Days, #20 TAB 0 Refills


   Prov: Liz Mendieta DO         12/30/17


Disposition:  01 DISCHARGE HOME


Condition:  Stable











Liz Mendieta DO Dec 30, 2017 19:30

## 2017-12-30 NOTE — RADRPT
EXAM DATE/TIME:  12/30/2017 14:40 

 

[facility] COMPARISON:     

No previous studies available for comparison.

 

                     

INDICATIONS :     

Left knee pain, swelling for 3 days with no known injury

                     

 

MEDICAL HISTORY :     

Osteoarthritis.  Rheumatoid arthritis.        

 

SURGICAL HISTORY :     

None.   

 

ENCOUNTER:     

Initial                                        

 

ACUITY:     

3 days      

 

PAIN SCORE:     

8/10

 

LOCATION:     

Left  entire knee

 

FINDINGS:     

No definite fractures, or dislocations are identified.  No definite lytic or sclerotic lesion is seen
.  Slight osteopenia is seen. Large joint effusion is seen. There is chondrocalcinosis worse in the l
ateral compartment with slight to moderate osteoarthritis with tricompartment.

 

CONCLUSION: Large joint effusion, chondrocalcinosis and osteoarthritis. 

 

 

 CESAR Lebron MD on December 30, 2017 at 15:49           

Board Certified Radiologist.

 This report was verified electronically.

## 2017-12-30 NOTE — PD
HPI


Chief Complaint:  Injury


Time Seen by Provider:  14:07


Travel History


International Travel<30 days:  No


Contact w/Intl Traveler<30days:  No


Traveled to known affect area:  No





History of Present Illness


HPI


62-year-old male arrives to the ER complaining of pain in the knees 

bilaterally.  He woke up with severe pain in the left knee and was unable to 

ambulate.  He reports previously having undergone cortisone injections was 

excellent benefit.  He denies interval injury.  No fever.  He has had pain in 

the knees for years evidently.  He denies any recent injury or excessive use.





PFSH


Past Medical History


Arthritis:  Yes (RA , OSTEOARTHRITIS)


Asthma:  Yes


Heart Rhythm Problems:  Yes (IRREGULAR HEART RYTHUM )


Cancer:  No


Cardiovascular Problems:  Yes


High Cholesterol:  No


Diminished Hearing:  No


Endocrine:  No


Gastrointestinal Disorders:  No


Genitourinary:  No


Hypertension:  Yes


Immune Disorder:  No


Implanted Vascular Access Dvce:  No


Musculoskeletal:  Yes (SPINAL STENOSIS, DEGENERATIVE JOINT DISEASE )


Neurologic:  No


Psychiatric:  No


Reproductive:  No


Respiratory:  Yes


Influenza Vaccination:  Yes





Past Surgical History


Other Surgery:  Yes





Social History


Alcohol Use:  Yes ("COUPLE DRINKS PER WEEK")


Tobacco Use:  Yes (0.5 ppd)


Substance Use:  No





Allergies-Medications


(Allergen,Severity, Reaction):  


Coded Allergies:  


     No Known Allergies (Unverified  Adverse Reaction, Unknown, 12/30/17)


Reported Meds & Prescriptions





Reported Meds & Active Scripts


Active


Potassium Chloride ER (Potassium Chloride) 10 Meq Cap 10 Meq PO DAILY


Lasix (Furosemide) 20 Mg Tab 20 Mg PO DAILY


Lopressor (Metoprolol Tartrate) 50 Mg Tab 75 Mg PO Q12HR


Eliquis (Apixaban) 5 Mg Tab 5 Mg PO BID


Reported


Latanoprost Opth Drops (Latanoprost) 0.005% Drops 1 Drop EACH EYE HS


     Refrigerate until opened.


Amlodipine (Amlodipine Besylate) 5 Mg Tab 5 Mg PO DAILY


Losartan (Losartan Potassium) 50 Mg Tab 50 Mg PO DAILY


Folic Acid 1 Mg Tablet 1 Mg PO DAILY


Methotrexate 2.5 Mg Tab 20 Mg PO Q7D


     Saturdays








Review of Systems


Except as stated in HPI:  all other systems reviewed are Neg


General / Constitutional:  No: Fever


Musculoskeletal:  Positive: Pain





Physical Exam


Narrative


GENERAL: 62-year-old male pleasant well-nourished and developed


SKIN: Focused skin assessment warm/dry.


HEAD: Atraumatic. Normocephalic. 


EYES: Pupils equal and round. No scleral icterus. No injection or drainage. 


ENT: No nasal bleeding or discharge.  Mucous membranes pink and moist.


NECK: Trachea midline. No JVD. 


CARDIOVASCULAR: Regular rate and rhythm.  No murmur appreciated.


RESPIRATORY: No accessory muscle use. Clear to auscultation. Breath sounds 

equal bilaterally. 


GASTROINTESTINAL: Abdomen soft, non-tender, nondistended. Hepatic and splenic 

margins not palpable. 


MUSCULOSKELETAL: There is generalized swelling about the left knee primarily 

suprapatellar distribution.  There is ballottement as well the left side.  The 

right side shows minimal suprapatellar swelling with no ballottement.  2 

posterior cells pedis bilaterally.  No evidence of DVT.


NEUROLOGICAL: Awake and alert. No obvious cranial nerve deficits.  Motor 

grossly within normal limits. Normal speech.


PSYCHIATRIC: Appropriate mood and affect; insight and judgment normal.





Data


Data


Last Documented VS





Vital Signs








  Date Time  Temp Pulse Resp B/P (MAP) Pulse Ox O2 Delivery O2 Flow Rate FiO2


 


12/30/17 14:04 97.7 72 16 99/70 (80) 100   





VS reviewed


Orders





 Orders


Lidocaine 1% Inj (Xylocaine 1% Inj) (12/30/17 14:15)


Knee, Complete (4vws) (12/30/17 )


Fluid Culture And Gram Stain (12/30/17 14:13)


Synovial Fluid Crystals (12/30/17 14:13)


Synovial Fl Cell Count + Diff (12/30/17 14:13)


Lidocai-Epi 1%-1:100,000 Inj (Xylocaine- (12/30/17 14:45)


Basic Metabolic Panel (Bmp) (12/30/17 15:26)


Complete Blood Count With Diff (12/30/17 15:26)


Iv Access Insert/Monitor (12/30/17 15:26)


Hydromorphone Pf Inj (Dilaudid Pf Inj) (12/30/17 15:30)


Ondansetron  Odt (Zofran  Odt) (12/30/17 15:30)





Labs





Laboratory Tests








Test


  12/30/17


15:26 12/30/17


15:30


 


Blood Urea Nitrogen 15 MG/DL  


 


Creatinine 1.38 MG/DL  


 


Random Glucose 78 MG/DL  


 


Calcium Level 9.5 MG/DL  


 


Sodium Level 136 MEQ/L  


 


Potassium Level 4.2 MEQ/L  


 


Chloride Level 98 MEQ/L  


 


Carbon Dioxide Level 27.1 MEQ/L  


 


Anion Gap 11 MEQ/L  


 


Estimat Glomerular Filtration


Rate 63 ML/MIN 


  


 











MDM


Medical Decision Making


Medical Screen Exam Complete:  Yes


Emergency Medical Condition:  Yes


Medical Record Reviewed:  Yes


Differential Diagnosis


Septic arthritis, osteoarthritis, crystalloid arthropathy


Narrative Course


There is concern for septic arthritis.  The patient underwent the aspiration 

and fluid was sent to lab.  Oncoming provider to follow-up synovial fluid 

analysis and disposition appropriately.





Procedures


**Procedure Narrative**


JOINT ASPIRATION: The left knee was sterilized with iodine.  It was dressed.  

With sterile gloves a 20 cc syringe was attached to 22-gauge needle and passed 

into the joint space after lidocaine with epinephrine injection. Yellow 

slightly cloudy discharge collected.











Mitch Schmidt MD Dec 30, 2017 16:08

## 2018-02-26 ENCOUNTER — HOSPITAL ENCOUNTER (OUTPATIENT)
Dept: HOSPITAL 17 - NEPE | Age: 63
Setting detail: OBSERVATION
LOS: 1 days | Discharge: HOME | End: 2018-02-27
Attending: INTERNAL MEDICINE | Admitting: INTERNAL MEDICINE
Payer: MEDICAID

## 2018-02-26 VITALS
DIASTOLIC BLOOD PRESSURE: 87 MMHG | TEMPERATURE: 98.2 F | SYSTOLIC BLOOD PRESSURE: 127 MMHG | RESPIRATION RATE: 20 BRPM | OXYGEN SATURATION: 95 % | HEART RATE: 66 BPM

## 2018-02-26 VITALS
DIASTOLIC BLOOD PRESSURE: 78 MMHG | RESPIRATION RATE: 18 BRPM | SYSTOLIC BLOOD PRESSURE: 113 MMHG | OXYGEN SATURATION: 96 % | HEART RATE: 77 BPM

## 2018-02-26 VITALS — OXYGEN SATURATION: 96 %

## 2018-02-26 VITALS
RESPIRATION RATE: 18 BRPM | DIASTOLIC BLOOD PRESSURE: 79 MMHG | HEART RATE: 79 BPM | OXYGEN SATURATION: 98 % | SYSTOLIC BLOOD PRESSURE: 123 MMHG

## 2018-02-26 VITALS
OXYGEN SATURATION: 98 % | TEMPERATURE: 97.4 F | HEART RATE: 79 BPM | SYSTOLIC BLOOD PRESSURE: 110 MMHG | DIASTOLIC BLOOD PRESSURE: 70 MMHG | RESPIRATION RATE: 18 BRPM

## 2018-02-26 VITALS — BODY MASS INDEX: 31.89 KG/M2 | WEIGHT: 198.42 LBS | HEIGHT: 66 IN

## 2018-02-26 VITALS — HEART RATE: 66 BPM

## 2018-02-26 DIAGNOSIS — J45.909: ICD-10-CM

## 2018-02-26 DIAGNOSIS — F17.210: ICD-10-CM

## 2018-02-26 DIAGNOSIS — R07.89: Primary | ICD-10-CM

## 2018-02-26 DIAGNOSIS — R94.31: ICD-10-CM

## 2018-02-26 DIAGNOSIS — Z79.899: ICD-10-CM

## 2018-02-26 DIAGNOSIS — I50.9: ICD-10-CM

## 2018-02-26 DIAGNOSIS — R00.2: ICD-10-CM

## 2018-02-26 DIAGNOSIS — I11.0: ICD-10-CM

## 2018-02-26 DIAGNOSIS — B34.9: ICD-10-CM

## 2018-02-26 DIAGNOSIS — Z79.01: ICD-10-CM

## 2018-02-26 DIAGNOSIS — I48.91: ICD-10-CM

## 2018-02-26 DIAGNOSIS — M48.00: ICD-10-CM

## 2018-02-26 DIAGNOSIS — M19.90: ICD-10-CM

## 2018-02-26 DIAGNOSIS — J06.9: ICD-10-CM

## 2018-02-26 DIAGNOSIS — M06.9: ICD-10-CM

## 2018-02-26 DIAGNOSIS — R11.0: ICD-10-CM

## 2018-02-26 DIAGNOSIS — I20.9: ICD-10-CM

## 2018-02-26 DIAGNOSIS — R53.83: ICD-10-CM

## 2018-02-26 DIAGNOSIS — R55: ICD-10-CM

## 2018-02-26 LAB
BASOPHILS # BLD AUTO: 0.1 TH/MM3 (ref 0–0.2)
BASOPHILS NFR BLD: 0.7 % (ref 0–2)
BUN SERPL-MCNC: 12 MG/DL (ref 7–18)
CALCIUM SERPL-MCNC: 8.8 MG/DL (ref 8.5–10.1)
CHLORIDE SERPL-SCNC: 106 MEQ/L (ref 98–107)
CREAT SERPL-MCNC: 1.76 MG/DL (ref 0.6–1.3)
EOSINOPHIL # BLD: 0.1 TH/MM3 (ref 0–0.4)
EOSINOPHIL NFR BLD: 1.1 % (ref 0–4)
ERYTHROCYTE [DISTWIDTH] IN BLOOD BY AUTOMATED COUNT: 13.7 % (ref 11.6–17.2)
GFR SERPLBLD BASED ON 1.73 SQ M-ARVRAT: 48 ML/MIN (ref 89–?)
GLUCOSE SERPL-MCNC: 101 MG/DL (ref 74–106)
HCO3 BLD-SCNC: 24.9 MEQ/L (ref 21–32)
HCT VFR BLD CALC: 39.1 % (ref 39–51)
HGB BLD-MCNC: 13.3 GM/DL (ref 13–17)
INR PPP: 1.1 RATIO
LYMPHOCYTES # BLD AUTO: 1.6 TH/MM3 (ref 1–4.8)
LYMPHOCYTES NFR BLD AUTO: 22.3 % (ref 9–44)
MAGNESIUM SERPL-MCNC: 1.8 MG/DL (ref 1.5–2.5)
MCH RBC QN AUTO: 31.5 PG (ref 27–34)
MCHC RBC AUTO-ENTMCNC: 34 % (ref 32–36)
MCV RBC AUTO: 92.6 FL (ref 80–100)
MONOCYTE #: 0.7 TH/MM3 (ref 0–0.9)
MONOCYTES NFR BLD: 9.3 % (ref 0–8)
NEUTROPHILS # BLD AUTO: 4.7 TH/MM3 (ref 1.8–7.7)
NEUTROPHILS NFR BLD AUTO: 66.6 % (ref 16–70)
PLATELET # BLD: 287 TH/MM3 (ref 150–450)
PMV BLD AUTO: 8.9 FL (ref 7–11)
PROTHROMBIN TIME: 10.8 SEC (ref 9.8–11.6)
RBC # BLD AUTO: 4.22 MIL/MM3 (ref 4.5–5.9)
SODIUM SERPL-SCNC: 138 MEQ/L (ref 136–145)
TROPONIN I SERPL-MCNC: (no result) NG/ML (ref 0.02–0.05)
WBC # BLD AUTO: 7.1 TH/MM3 (ref 4–11)

## 2018-02-26 PROCEDURE — 85610 PROTHROMBIN TIME: CPT

## 2018-02-26 PROCEDURE — 93005 ELECTROCARDIOGRAM TRACING: CPT

## 2018-02-26 PROCEDURE — 71046 X-RAY EXAM CHEST 2 VIEWS: CPT

## 2018-02-26 PROCEDURE — 93017 CV STRESS TEST TRACING ONLY: CPT

## 2018-02-26 PROCEDURE — G0378 HOSPITAL OBSERVATION PER HR: HCPCS

## 2018-02-26 PROCEDURE — 94664 DEMO&/EVAL PT USE INHALER: CPT

## 2018-02-26 PROCEDURE — A9502 TC99M TETROFOSMIN: HCPCS

## 2018-02-26 PROCEDURE — 80048 BASIC METABOLIC PNL TOTAL CA: CPT

## 2018-02-26 PROCEDURE — 78452 HT MUSCLE IMAGE SPECT MULT: CPT

## 2018-02-26 PROCEDURE — 84484 ASSAY OF TROPONIN QUANT: CPT

## 2018-02-26 PROCEDURE — 83735 ASSAY OF MAGNESIUM: CPT

## 2018-02-26 PROCEDURE — 82550 ASSAY OF CK (CPK): CPT

## 2018-02-26 PROCEDURE — 82552 ASSAY OF CPK IN BLOOD: CPT

## 2018-02-26 PROCEDURE — 83880 ASSAY OF NATRIURETIC PEPTIDE: CPT

## 2018-02-26 PROCEDURE — 85025 COMPLETE CBC W/AUTO DIFF WBC: CPT

## 2018-02-26 PROCEDURE — 94640 AIRWAY INHALATION TREATMENT: CPT

## 2018-02-26 PROCEDURE — 99285 EMERGENCY DEPT VISIT HI MDM: CPT

## 2018-02-26 RX ADMIN — Medication SCH ML: at 21:49

## 2018-02-26 RX ADMIN — METOPROLOL TARTRATE SCH MG: 50 TABLET, FILM COATED ORAL at 21:50

## 2018-02-26 RX ADMIN — ALBUTEROL SULFATE SCH MG: 2.5 SOLUTION RESPIRATORY (INHALATION) at 18:00

## 2018-02-26 RX ADMIN — APIXABAN SCH MG: 5 TABLET, FILM COATED ORAL at 21:50

## 2018-02-26 RX ADMIN — ALBUTEROL SULFATE SCH MG: 2.5 SOLUTION RESPIRATORY (INHALATION) at 20:57

## 2018-02-26 NOTE — HHI.HP
[facility]


Primary Care Physician


Dr. Kline


Chief Complaint


Palpations and fatigue


History of Present Illness


62-year-old male with known atrial fibrillation, rheumatoid arthritis, and 

hypertension presents to emergency room for further evaluation of palpitations 

and fatigue.  Onset 1 week ago.  Reports "not feeling like myself", productive 

cough, decreased appetite, intermittent dyspnea, and palpitations.  Symptoms 

waxed and waned in intensity.  This afternoon while waiting for a friend 

outside at a local convenience store reports possible syncope event. He was 

sitting on a bench and remembers seeing manager of the store. The next thing he 

remembers is the manager asking him if he was okay. Denies falling, reports 

remaining on bench during syncopal episode. Come to ER for further evaluation 

due to combination above. Denies having chest pain. No fevers or chills.





Review of Systems


General: One week fatigue and malaise. No weakness, fever, chills. 


HEENT: No HA, no vision changes, no nasal congestion or drainage, no dysphasia


CV: As stated above. No CP, pressure. Intermittent palpations, known afib. 

Denies feelings of rapid heart bead or dizziness.


RESP: History of asthma.  Intermittent dyspnea, productive cough, thin white 

sputum, with occasional wheeze


GI: No nausea, vomiting, or bowel changes.  Decreased appetite 1 week.


: No dysuria, urgency, frequency


EXT: No lower leg edema, no paraesthesias


MS: No discomfort or change in ROM.  Rheumatoid arthritis.  Right knee chronic 

pain.


NEURO: As stated above, possible syncopal.  No difficulty with balance or motor/

sensory deficits


PSYCH: No anxiety, depression, suicidal ideation


SKIN: No rashes, no concerning lesions





Past Family Social History


Allergies:  


Coded Allergies:  


     No Known Allergies (Unverified  Allergy, Unknown, 18)


Past Medical History


Atrial fibrillation, rheumatoid arthritis, osteoarthritis, asthma, hypertension

, spinal stenosis


Reported Medications





Reported Meds & Active Scripts


Active


Potassium Chloride ER (Potassium Chloride) 10 Meq Cap 10 Meq PO DAILY


Lasix (Furosemide) 20 Mg Tab 20 Mg PO DAILY


Lopressor (Metoprolol Tartrate) 50 Mg Tab 75 Mg PO Q12HR


Eliquis (Apixaban) 5 Mg Tab 5 Mg PO BID


Amlodipine (Amlodipine Besylate) 5 Mg Tab 5 Mg PO DAILY


Folic Acid 1 Mg Tablet 1 Mg PO DAILY


Methotrexate 2.5 Mg Tab 20 Mg PO Q7D


     Saturdays


Active Ordered Medications





Current Medications








 Medications


  (Trade)  Dose


 Ordered  Sig/Desire


 Route  Start Time


 Stop Time Status Last Admin


 


  (NS Flush)  2 ml  UNSCH  PRN


 IV FLUSH  18 17:15


     


 


 


  (NS Flush)  2 ml  BID


 IV FLUSH  18 21:00


     


 


 


  (Tylenol)  500 mg  Q4H  PRN


 PO  18 17:15


     


 


 


  (Zofran Inj)  4 mg  Q6H  PRN


 IV PUSH  18 17:15


     


 


 


  (Nitrostat Sl)  0.4 mg  Q5M  PRN


 SL  18 17:15


     


 


 


  (Aspirin)  325 mg  DAILY


 PO  18 09:00


     


 








Family History


Noncontributory for early onset cardiovascular disease.


Social History


No known coronary artery disease, diabetes, or hyperlipidemia.  Known 

hypertension.


Endorses occasional cigarette and alcohol.





Past cardiac testing


Patient's cardiologist is Dr. Kalani Delgadillo.  No recent stress testing. Never 

required cardiac catheterization.





Physical Exam


Vital Signs





Vital Signs








  Date Time  Temp Pulse Resp B/P (MAP) Pulse Ox O2 Delivery O2 Flow Rate FiO2


 


18 17:26        


 


18 17:16  77 18 113/78 (90) 96 Room Air  


 


18 15:14  69 18  97 Room Air  


 


18 15:06   18  98 Room Air  


 


18 15:06     97 Room Air  


 


18 15:06  79  110/79 (89)    





    123/83 (96)    


 


18 15:05 97.4 79 18 110/70 (83) 98 Room Air  








Physical Exam


GENERAL: Alert WN, WD, NAD, pleasant,  male


HEAD: NC, AT


EYES: Sclera and conjunctiva injected


ENT: Mucous membranes pink and moist


CV: Regular rate with periods of irregularity, without murmur, rub, gallop. S1-

S2 no S3-S4. 


RESP: Inspiratory and expiratory wheeze throughout, no crackles or rhonchi, 

symmetrical chest rise, nonlabored, able to speak in full sentences


ABD: Soft, NT, ND, no masses, positive bowel tones


EXT: Pulses +24, no dependent edema


MS: Normal tone 4 extremities, nontender, no obvious deformities, full range 

of motion


NEURO: CN II through CN XII grossly intact, motor strength 5/5


PSYCH: A+O 3, pleasant affect, appropriate speech, mood, insight and judgment


SKIN: Normal turgor, normal texture, no lesions, no rashes


Laboratory





Laboratory Tests








Test


  18


15:10 18


16:02


 


Prothrombin Time 10.8  


 


Prothromb Time International


Ratio 1.1 


  


 


 


Blood Urea Nitrogen 12  


 


Creatinine 1.76  


 


Random Glucose 101  


 


Calcium Level 8.8  


 


Magnesium Level 1.8  


 


Sodium Level 138  


 


Potassium Level 4.3  


 


Chloride Level 106  


 


Carbon Dioxide Level 24.9  


 


Anion Gap 7  


 


Estimat Glomerular Filtration


Rate 48 


  


 


 


Troponin I LESS THAN 0.02  


 


B-Type Natriuretic Peptide 41  


 


White Blood Count  7.1 


 


Red Blood Count  4.22 


 


Hemoglobin  13.3 


 


Hematocrit  39.1 


 


Mean Corpuscular Volume  92.6 


 


Mean Corpuscular Hemoglobin  31.5 


 


Mean Corpuscular Hemoglobin


Concent 


  34.0 


 


 


Red Cell Distribution Width  13.7 


 


Platelet Count  287 


 


Mean Platelet Volume  8.9 


 


Neutrophils (%) (Auto)  66.6 


 


Lymphocytes (%) (Auto)  22.3 


 


Monocytes (%) (Auto)  9.3 


 


Eosinophils (%) (Auto)  1.1 


 


Basophils (%) (Auto)  0.7 


 


Neutrophils # (Auto)  4.7 


 


Lymphocytes # (Auto)  1.6 


 


Monocytes # (Auto)  0.7 


 


Eosinophils # (Auto)  0.1 


 


Basophils # (Auto)  0.1 


 


CBC Comment  DIFF FINAL 


 


Differential Comment   








Result Diagram:  


18 1602                                                                   

             18 1510





Imaging


Chest x-ray read by radiologist reads no acute cardiopulmonary process.


Course


EKG


First EKG-NSR, PACs, no ST change, T wave inversion V3-V6





Caprini VTE Risk Assessment


Caprini VTE Risk Assessment:  Mod/High Risk (score >= 2)


Caprini Risk Assessment Model











 Point Value = 1          Point Value = 2  Point Value = 3  Point Value = 5


 


Age 41-60


Minor surgery


BMI > 25 kg/m2


Swollen legs


Varicose veins


Pregnancy or postpartum


History of unexplained or recurrent


   spontaneous 


Oral contraceptives or hormone


   replacement


Sepsis (< 1 month)


Serious lung disease, including


   pneumonia (< 1 month)


Abnormal pulmonary function


Acute myocardial infarction


Congestive heart failure (< 1 month)


History of inflammatory bowel disease


Medical patient at bed rest Age 61-74


Arthroscopic surgery


Major open surgery (> 45 min)


Laparoscopic surgery (> 45 min)


Malignancy


Confined to bed (> 72 hours)


Immobilizing plaster cast


Central venous access Age >= 75


History of VTE


Family history of VTE


Factor V Leiden


Prothrombin 55240X


Lupus anticoagulant


Anticardiolipin antibodies


Elevated serum homocysteine


Heparin-induced thrombocytopenia


Other congenital or acquired


   thrombophilia Stroke (< 1 month)


Elective arthroplasty


Hip, pelvis, or leg fracture


Acute spinal cord injury (< 1 month)








Prophylaxis Regimen











   Total Risk


Factor Score Risk Level Prophylaxis Regimen


 


0-1      Low Early ambulation


 


2 Moderate Order ONE of the following:


*Sequential Compression Device (SCD)


*Heparin 5000 units SQ BID


 


3-4 Higher Order ONE of the following medications:


*Heparin 5000 units SQ TID


*Enoxaparin/Lovenox 40 mg SQ daily (WT < 150 kg, CrCl > 30 mL/min)


*Enoxaparin/Lovenox 30 mg SQ daily (WT < 150 kg, CrCl > 10-29 mL/min)


*Enoxaparin/Lovenox 30 mg SQ BID (WT < 150 kg, CrCl > 30 mL/min)


AND/OR


*Sequential Compression Device (SCD)


 


5 or more Highest Order ONE of the following medications:


*Heparin 5000 units SQ TID (Preferred with Epidurals)


*Enoxaparin/Lovenox 40 mg SQ daily (WT < 150 kg, CrCl > 30 mL/min)


*Enoxaparin/Lovenox 30 mg SQ daily (WT < 150 kg, CrCl > 10-29 mL/min)


*Enoxaparin/Lovenox 30 mg SQ BID (WT < 150 kg, CrCl > 30 mL/min)


AND


*Sequential Compression Device (SCD)











Assessment and Plan


Assessment and Plan


#1 Atypical chest discomfort-admitted to chest pain. Rule out with 3 sets of EKG

, cardiac enzymes, and monitor on telemetry overnight. Will be seen and 

evaluated by Dr. Bhumika Alonzo in am.  Discussed possible cardiac stress 

testing in a.m.,  This will be determined after evaluation by cardiologist. 

Patient is agreeable to plan of care. Monitor reviewed, no acute abnormalities 

or tachycardia noted. First ekg nsr with pacs, during exam in ER, cardiac 

rhythm appeared to be atrial fibrillation


#2 History of atrial fibrillation-continue Eliquis


#3 Viral syndrome-albuterol nebulizer treatments every 6 hours and every 2 when 

necessary, encouraged fluid intake and supportive measures. 


#4 History of hypertension-continue amlodipine











Shellie Monteiro 2018 17:36

## 2018-02-26 NOTE — RADRPT
EXAM DATE/TIME:  02/26/2018 15:12 

 

[facility] COMPARISON:     

CHEST SINGLE AP, July 07, 2017, 13:58.

 

                     

INDICATIONS :     

Short of breath, weakness, fluttering in chest for 1 week

                     

 

MEDICAL HISTORY :     

Congestive heart failure.       A-fib   

 

SURGICAL HISTORY :     

None.   

 

ENCOUNTER:     

Initial                                        

 

ACUITY:     

1 week      

 

PAIN SCORE:     

Non-responsive.

 

LOCATION:     

Bilateral chest 

 

FINDINGS:     

PA and lateral views of the chest demonstrate a normal-sized cardiac silhouette. There is no effusion
, consolidation, or pneumothorax. The bones and soft tissues demonstrate no acute abnormality. There 
is osteoarthritis at the right glenohumeral joint.

 

CONCLUSION:     

No acute cardiopulmonary abnormality is identified.

 

 

 

 Marlo Betancur MD on February 26, 2018 at 15:26           

Board Certified Radiologist.

 This report was verified electronically.

## 2018-02-26 NOTE — PD
HPI


Chief Complaint:  chest pain, palpitations, shortness of breath


Time Seen by Provider:  14:54


Travel History


International Travel<30 days:  No


Contact w/Intl Traveler<30days:  No


Traveled to known affect area:  No





History of Present Illness


HPI


Old male came to the emergency room with one-week history of progressive 

palpitations, chest discomfort and shortness of breath.  He has history of A. 

fib and he was told if he had these symptoms he needs to come to the emergency 

room.  Patient is on Lasix 40 mg once a day which has not been changed.  Pain 

is mostly in the form of discomfort in the entire chest.  No radiation.  

Shortness of breath and all the other symptoms worsen upon exercise or 

ambulation.  Vital signs were relatively stable.  Patient is on Eliquis for his 

A. fib.  He appeared anxious.  He was brought in by EMS.





[facility]


Past Medical History


*** Narrative Medical


List of his past medical, surgical, social and family histories be from the 

nursing note.


Arthritis:  Yes (RA , OSTEOARTHRITIS)


Asthma:  Yes


Heart Rhythm Problems:  Yes (IRREGULAR HEART RYTHUM )


Cancer:  No


Cardiovascular Problems:  Yes


High Cholesterol:  No


Diminished Hearing:  No


Endocrine:  No


Gastrointestinal Disorders:  No


Genitourinary:  No


Hypertension:  Yes


Immune Disorder:  No


Implanted Vascular Access Dvce:  No


Musculoskeletal:  Yes (SPINAL STENOSIS, DEGENERATIVE JOINT DISEASE )


Neurologic:  No


Psychiatric:  No


Reproductive:  No


Respiratory:  Yes





Past Surgical History


Other Surgery:  Yes





Social History


Alcohol Use:  Yes ("COUPLE DRINKS PER WEEK")


Tobacco Use:  Yes (0.5 ppd)


Substance Use:  No





Allergies-Medications


(Allergen,Severity, Reaction):  


Coded Allergies:  


     No Known Allergies (Unverified  Allergy, Unknown, 2/26/18)


Comments


No known drug allergies.


Reported Meds & Prescriptions





Reported Meds & Active Scripts


Active


Potassium Chloride ER (Potassium Chloride) 10 Meq Cap 10 Meq PO DAILY


Lasix (Furosemide) 20 Mg Tab 20 Mg PO DAILY


Lopressor (Metoprolol Tartrate) 50 Mg Tab 75 Mg PO Q12HR


Eliquis (Apixaban) 5 Mg Tab 5 Mg PO BID


Reported


Amlodipine (Amlodipine Besylate) 5 Mg Tab 5 Mg PO DAILY


Folic Acid 1 Mg Tablet 1 Mg PO DAILY


Methotrexate 2.5 Mg Tab 20 Mg PO Q7D


     Saturdays





Narrative Medication


List of his home medications reviewed from the nursing note.





Review of Systems


Except as stated in HPI:  all other systems reviewed are Neg


Cardiovascular:  Positive: Chest Pain or Discomfort, Palpitations


Respiratory:  Positive: Shortness of Breath





Physical Exam


Narrative


GENERAL: Awake, alert, anxious


SKIN: Focused skin assessment warm/dry.


HEAD: Atraumatic. Normocephalic. 


EYES: Pupils equal and round. No scleral icterus. No injection or drainage. 


ENT: No nasal bleeding or discharge.  Mucous membranes pink and moist.


NECK: Trachea midline. No JVD. 


CARDIOVASCULAR: Regular rate and rhythm.  No murmur appreciated.


RESPIRATORY: No accessory muscle use. Clear to auscultation. Breath sounds 

equal bilaterally. 


GASTROINTESTINAL: Abdomen soft, non-tender, nondistended. Hepatic and splenic 

margins not palpable. 


MUSCULOSKELETAL: No obvious deformities. No clubbing.  No cyanosis.  No edema. 


NEUROLOGICAL: Awake and alert. No obvious cranial nerve deficits.  Motor 

grossly within normal limits. Normal speech.


PSYCHIATRIC: Appropriate mood and affect; insight and judgment normal.





Data


Data


Last Documented VS





Vital Signs








  Date Time  Temp Pulse Resp B/P (MAP) Pulse Ox O2 Delivery O2 Flow Rate FiO2


 


2/26/18 15:14  69 18  97 Room Air  


 


2/26/18 15:06        


 


2/26/18 15:05 97.4       








Orders





 Orders


Electrocardiogram (2/26/18 14:55)


Basic Metabolic Panel (Bmp) (2/26/18 14:55)


B-Type Natriuretic Peptide (2/26/18 14:55)


Complete Blood Count With Diff (2/26/18 14:55)


Magnesium (Mg) (2/26/18 14:55)


Prothrombin Time / Inr (Pt) (2/26/18 14:55)


Troponin I (2/26/18 14:55)


Ecg Monitoring (2/26/18 14:55)


Bilateral Bp Monitoring (2/26/18 14:55)


Iv Access Insert/Monitor (2/26/18 14:55)


Oximetry (2/26/18 14:55)


Oxygen Administration (2/26/18 14:55)


Sodium Chloride 0.9% Flush (Ns Flush) (2/26/18 15:00)


Chest, Pa & Lat (2/26/18 14:55)


Admit Order (Ed Use Only) (2/26/18 16:49)





Labs





Laboratory Tests








Test


  2/26/18


15:10 2/26/18


16:02


 


Prothrombin Time 10.8 SEC  


 


Prothromb Time International


Ratio 1.1 RATIO 


  


 


 


Blood Urea Nitrogen 12 MG/DL  


 


Creatinine 1.76 MG/DL  


 


Random Glucose 101 MG/DL  


 


Calcium Level 8.8 MG/DL  


 


Magnesium Level 1.8 MG/DL  


 


Sodium Level 138 MEQ/L  


 


Potassium Level 4.3 MEQ/L  


 


Chloride Level 106 MEQ/L  


 


Carbon Dioxide Level 24.9 MEQ/L  


 


Anion Gap 7 MEQ/L  


 


Estimat Glomerular Filtration


Rate 48 ML/MIN 


  


 


 


Troponin I


  LESS THAN 0.02


NG/ML 


 


 


B-Type Natriuretic Peptide 41 PG/ML  


 


White Blood Count  7.1 TH/MM3 


 


Red Blood Count  4.22 MIL/MM3 


 


Hemoglobin  13.3 GM/DL 


 


Hematocrit  39.1 % 


 


Mean Corpuscular Volume  92.6 FL 


 


Mean Corpuscular Hemoglobin  31.5 PG 


 


Mean Corpuscular Hemoglobin


Concent 


  34.0 % 


 


 


Red Cell Distribution Width  13.7 % 


 


Platelet Count  287 TH/MM3 


 


Mean Platelet Volume  8.9 FL 


 


Neutrophils (%) (Auto)  66.6 % 


 


Lymphocytes (%) (Auto)  22.3 % 


 


Monocytes (%) (Auto)  9.3 % 


 


Eosinophils (%) (Auto)  1.1 % 


 


Basophils (%) (Auto)  0.7 % 


 


Neutrophils # (Auto)  4.7 TH/MM3 


 


Lymphocytes # (Auto)  1.6 TH/MM3 


 


Monocytes # (Auto)  0.7 TH/MM3 


 


Eosinophils # (Auto)  0.1 TH/MM3 


 


Basophils # (Auto)  0.1 TH/MM3 


 


CBC Comment  DIFF FINAL 


 


Differential Comment   











MDM


Medical Decision Making


Medical Screen Exam Complete:  Yes


Emergency Medical Condition:  Yes


Medical Record Reviewed:  Yes


Interpretation(s)


Twelve-lead EKG was reviewed by me.  Normal sinus rhythm, normal axis, PACs, 

lateral T wave inversion.  Heart rate of 81 bpm.


Differential Diagnosis


ACS, non-STEMI, congestive heart failure


Narrative Course


4:54 PM blood test results of back and within acceptable limits.  Chest x-rays 

negative for CHF.  Patient will be admitted to the chest pain center.





Procedures


EKG Prior to Arrival:  Yes





Diagnosis





 Primary Impression:  


 Chest pain


 Qualified Codes:  R07.9 - Chest pain, unspecified


 Additional Impression:  


 Palpitations





Admitting Information


Admitting Physician Requests:  Randolph Dejesus MD Feb 26, 2018 14:55

## 2018-02-27 VITALS
SYSTOLIC BLOOD PRESSURE: 130 MMHG | DIASTOLIC BLOOD PRESSURE: 72 MMHG | RESPIRATION RATE: 16 BRPM | TEMPERATURE: 98.4 F | OXYGEN SATURATION: 99 % | HEART RATE: 62 BPM

## 2018-02-27 VITALS
SYSTOLIC BLOOD PRESSURE: 119 MMHG | RESPIRATION RATE: 16 BRPM | DIASTOLIC BLOOD PRESSURE: 78 MMHG | HEART RATE: 69 BPM | OXYGEN SATURATION: 96 % | TEMPERATURE: 98.6 F

## 2018-02-27 VITALS
DIASTOLIC BLOOD PRESSURE: 84 MMHG | SYSTOLIC BLOOD PRESSURE: 126 MMHG | OXYGEN SATURATION: 96 % | TEMPERATURE: 97.6 F | RESPIRATION RATE: 19 BRPM | HEART RATE: 67 BPM

## 2018-02-27 VITALS — HEART RATE: 74 BPM

## 2018-02-27 VITALS — HEART RATE: 65 BPM

## 2018-02-27 VITALS — OXYGEN SATURATION: 97 %

## 2018-02-27 RX ADMIN — ALBUTEROL SULFATE SCH MG: 2.5 SOLUTION RESPIRATORY (INHALATION) at 07:32

## 2018-02-27 RX ADMIN — APIXABAN SCH MG: 5 TABLET, FILM COATED ORAL at 09:32

## 2018-02-27 RX ADMIN — Medication SCH ML: at 09:00

## 2018-02-27 RX ADMIN — ALBUTEROL SULFATE SCH MG: 2.5 SOLUTION RESPIRATORY (INHALATION) at 03:04

## 2018-02-27 RX ADMIN — METOPROLOL TARTRATE SCH MG: 50 TABLET, FILM COATED ORAL at 09:00

## 2018-02-27 NOTE — RADRPT
EXAM DATE/TIME:  02/27/2018 12:09 

 

[facility] COMPARISON:     

CHEST PA & LAT, February 26, 2018, 15:12.

 

 

INDICATIONS :     

Syncope with palpitations. Angina. 

                           

 

DOSE:     

26.5 mCi Tc99m Myoview at stress.

                     8.5 mCi Tc99m Myoview at rest.

                     0.4 mg Lexiscan

                       

 

 

STRESS SYMPTOMS:      

Shortness of breath and nausea.

                       

 

 

EJECTION FRACTION:       

41%

                       

 

MEDICAL HISTORY :     

Hypertension.   Atrial fibrillation.

 

SURGICAL HISTORY :         

Back surgery.

 

ENCOUNTER:     

Initial

 

ACUITY:     

1 day

 

PAIN SCALE:     

3/10

 

LOCATION:      

Left chest 

 

TECHNIQUE:     

The patient underwent pharmacologic stress with infusion of prescribed dose.  Continuous ECG tracing 
was monitored during stress.  Gated SPECT imaging was performed after stress and conventional SPECT i
maging was performed at rest.  The examination was performed on a SPECT/CT scanner, both attenuation 
and non-corrected datasets were reviewed.

 

FINDINGS:     

 

DISTRIBUTION:     

The maximum perfused segment at stress is in the anterolateral wall.

 

PERFUSION STUDY:     

The pattern of perfusion at stress is within normal limits. No fixed or reversible perfusion defect i
s identified.

 

GATED STUDY:     

There is mild global hypokinesia. No focal wall motion abnormalities identified.

 

CONCLUSION:     

1. No fixed or reversible perfusion abnormality is identified.

2. Mild global hypokinesia with reduced left ventricle ejection fraction calculated at 41%.

 

RISK CATEGORY:     

Intermediate (1-3% Annual Mortality Rate)

 

 

 

 

 Marlo Betancur MD on February 27, 2018 at 14:22           

Board Certified Radiologist.

 This report was verified electronically.

## 2018-02-27 NOTE — TR
Date Performed: 02/27/2018       Time Performed: 12:37:49

 

DOCTOR:      Bhumika Alonzo 

 

DRUG LIST:     

CLINICAL HISTORY:      CHEST  PAIN

REASON FOR TEST:      CHEST  PAIN

REASON FOR ENDING:     

OBSERVATION:     

CONCLUSION:      Lexiscan stress test was performed under standard four minute protocol.  Radionuclid
e was injected one minute prior to ending the test. No electrocardiographic abormalities were present
 to suggest ischemia. Nuclear imaging and interpretation are pending.

COMMENTS:

## 2018-02-27 NOTE — HHI.DCPOC
Discharge Care Plan


Diagnosis:  


(1) Chest pain


(2) Palpitations


(3) Hypertension


(4) Upper respiratory infection, viral


(5) History of atrial fibrillation


Goals to Promote Your Health


* To prevent worsening of your condition and complications


* To maintain your health at the optimal level


Directions to Meet Your Goals


*** Take your medications as prescribed


*** Follow your dietary instruction


*** Follow activity as directed








*** Keep your appointments as scheduled


*** Take your immunizations and boosters as scheduled


*** If your symptoms worsen call your PCP, if no PCP go to Urgent Care Center 

or Emergency Room***


*** Smoking is Dangerous to Your Health. Avoid second hand smoke***


***Call the 24-hour hour crisis hotline for domestic abuse at 1-667.931.9459***











Ahsan Ayers Feb 27, 2018 14:49

## 2018-02-27 NOTE — EKG
Date Performed: 2018       Time Performed: 21:43:46

 

PTAGE:      62 years

 

EKG:      Sinus rhythm 

 

 WITH OCCASIONAL SUPRAVENTRICULAR PREMATURE COMPLEXES MODERATE T-WAVE ABNORMALITY, CONSIDER ANTEROLAT
ERAL ISCHEMIA MODERATE T-WAVE ABNORMALITY, CONSIDER INFERIOR ISCHEMIA ABNORMAL ECG Since 

 

 PREVIOUS TRACING            , no significant change noted PREVIOUS TRACIN2018 18.58

 

DOCTOR:   Bhumika Alonzo  Interpretating Date/Time  2018 13:35:58

## 2018-02-27 NOTE — EKG
Date Performed: 2018       Time Performed: 18:58:07

 

PTAGE:      62 years

 

EKG:      Sinus rhythm 

 

 WITH OCCASIONAL SUPRAVENTRICULAR PREMATURE COMPLEXES MODERATE T-WAVE ABNORMALITY, CONSIDER ANTEROLAT
ERAL ISCHEMIA ABNORMAL ECG Since 

 

 PREVIOUS TRACING            , no significant change noted PREVIOUS TRACIN2018 15.00

 

DOCTOR:   Bhumika Alonzo  Interpretating Date/Time  2018 13:36:22

## 2018-02-27 NOTE — EKG
Date Performed: 02/26/2018       Time Performed: 15:00:00

 

PTAGE:      62 years

 

EKG:      Sinus rhythm 

 

 WITH OCCASIONAL SUPRAVENTRICULAR PREMATURE COMPLEXES POSSIBLE RIGHT VENTRICULAR CONDUCTION DELAY MOD
ERATE T-WAVE ABNORMALITY, CONSIDER ANTEROLATERAL ISCHEMIA ABNORMAL ECG Since 

 

 PREVIOUS TRACING            , no significant change noted

 

DOCTOR:   Bhumika Alonzo  Interpretating Date/Time  02/27/2018 13:38:14

## 2018-03-07 ENCOUNTER — HOSPITAL ENCOUNTER (OUTPATIENT)
Dept: HOSPITAL 17 - NEPD | Age: 63
Setting detail: OBSERVATION
LOS: 7 days | Discharge: HOME | End: 2018-03-14
Attending: HOSPITALIST | Admitting: HOSPITALIST
Payer: MEDICAID

## 2018-03-07 VITALS
SYSTOLIC BLOOD PRESSURE: 141 MMHG | DIASTOLIC BLOOD PRESSURE: 72 MMHG | OXYGEN SATURATION: 97 % | HEART RATE: 72 BPM | RESPIRATION RATE: 16 BRPM

## 2018-03-07 VITALS
HEART RATE: 57 BPM | DIASTOLIC BLOOD PRESSURE: 74 MMHG | OXYGEN SATURATION: 96 % | SYSTOLIC BLOOD PRESSURE: 116 MMHG | RESPIRATION RATE: 18 BRPM | TEMPERATURE: 98.1 F

## 2018-03-07 DIAGNOSIS — M11.20: ICD-10-CM

## 2018-03-07 DIAGNOSIS — F17.200: ICD-10-CM

## 2018-03-07 DIAGNOSIS — M25.461: ICD-10-CM

## 2018-03-07 DIAGNOSIS — M25.572: ICD-10-CM

## 2018-03-07 DIAGNOSIS — Z79.891: ICD-10-CM

## 2018-03-07 DIAGNOSIS — I48.91: ICD-10-CM

## 2018-03-07 DIAGNOSIS — M79.672: ICD-10-CM

## 2018-03-07 DIAGNOSIS — M79.671: ICD-10-CM

## 2018-03-07 DIAGNOSIS — Z59.0: ICD-10-CM

## 2018-03-07 DIAGNOSIS — M25.571: ICD-10-CM

## 2018-03-07 DIAGNOSIS — M25.462: ICD-10-CM

## 2018-03-07 DIAGNOSIS — Z79.899: ICD-10-CM

## 2018-03-07 DIAGNOSIS — I11.0: ICD-10-CM

## 2018-03-07 DIAGNOSIS — G89.29: ICD-10-CM

## 2018-03-07 DIAGNOSIS — D72.829: ICD-10-CM

## 2018-03-07 DIAGNOSIS — Z79.01: ICD-10-CM

## 2018-03-07 DIAGNOSIS — E78.00: ICD-10-CM

## 2018-03-07 DIAGNOSIS — I50.9: ICD-10-CM

## 2018-03-07 DIAGNOSIS — M19.90: ICD-10-CM

## 2018-03-07 DIAGNOSIS — M06.9: ICD-10-CM

## 2018-03-07 DIAGNOSIS — R26.2: Primary | ICD-10-CM

## 2018-03-07 DIAGNOSIS — E87.6: ICD-10-CM

## 2018-03-07 DIAGNOSIS — J45.909: ICD-10-CM

## 2018-03-07 LAB
ALBUMIN SERPL-MCNC: 2.9 GM/DL (ref 3.4–5)
ALP SERPL-CCNC: 106 U/L (ref 45–117)
ALT SERPL-CCNC: 22 U/L (ref 12–78)
AST SERPL-CCNC: 24 U/L (ref 15–37)
BASOPHILS # BLD AUTO: 0.1 TH/MM3 (ref 0–0.2)
BASOPHILS NFR BLD: 0.8 % (ref 0–2)
BILIRUB SERPL-MCNC: 1.1 MG/DL (ref 0.2–1)
BUN SERPL-MCNC: 15 MG/DL (ref 7–18)
CALCIUM SERPL-MCNC: 9.5 MG/DL (ref 8.5–10.1)
CHLORIDE SERPL-SCNC: 95 MEQ/L (ref 98–107)
CREAT SERPL-MCNC: 1.29 MG/DL (ref 0.6–1.3)
EOSINOPHIL # BLD: 0 TH/MM3 (ref 0–0.4)
EOSINOPHIL NFR BLD: 0.3 % (ref 0–4)
ERYTHROCYTE [DISTWIDTH] IN BLOOD BY AUTOMATED COUNT: 14 % (ref 11.6–17.2)
GFR SERPLBLD BASED ON 1.73 SQ M-ARVRAT: 69 ML/MIN (ref 89–?)
GLUCOSE SERPL-MCNC: 95 MG/DL (ref 74–106)
HCO3 BLD-SCNC: 30.3 MEQ/L (ref 21–32)
HCT VFR BLD CALC: 41.2 % (ref 39–51)
HGB BLD-MCNC: 14.2 GM/DL (ref 13–17)
LYMPHOCYTES # BLD AUTO: 1.7 TH/MM3 (ref 1–4.8)
LYMPHOCYTES NFR BLD AUTO: 11.6 % (ref 9–44)
MAGNESIUM SERPL-MCNC: 1.8 MG/DL (ref 1.5–2.5)
MCH RBC QN AUTO: 31.6 PG (ref 27–34)
MCHC RBC AUTO-ENTMCNC: 34.5 % (ref 32–36)
MCV RBC AUTO: 91.8 FL (ref 80–100)
MONOCYTE #: 1.5 TH/MM3 (ref 0–0.9)
MONOCYTES NFR BLD: 10.5 % (ref 0–8)
NEUTROPHILS # BLD AUTO: 11.1 TH/MM3 (ref 1.8–7.7)
NEUTROPHILS NFR BLD AUTO: 76.8 % (ref 16–70)
PLATELET # BLD: 344 TH/MM3 (ref 150–450)
PMV BLD AUTO: 8.6 FL (ref 7–11)
PROT SERPL-MCNC: 9.1 GM/DL (ref 6.4–8.2)
RBC # BLD AUTO: 4.49 MIL/MM3 (ref 4.5–5.9)
SODIUM SERPL-SCNC: 133 MEQ/L (ref 136–145)
WBC # BLD AUTO: 14.5 TH/MM3 (ref 4–11)

## 2018-03-07 PROCEDURE — 97162 PT EVAL MOD COMPLEX 30 MIN: CPT

## 2018-03-07 PROCEDURE — 84550 ASSAY OF BLOOD/URIC ACID: CPT

## 2018-03-07 PROCEDURE — 86140 C-REACTIVE PROTEIN: CPT

## 2018-03-07 PROCEDURE — 83735 ASSAY OF MAGNESIUM: CPT

## 2018-03-07 PROCEDURE — 85652 RBC SED RATE AUTOMATED: CPT

## 2018-03-07 PROCEDURE — 96375 TX/PRO/DX INJ NEW DRUG ADDON: CPT

## 2018-03-07 PROCEDURE — 85025 COMPLETE CBC W/AUTO DIFF WBC: CPT

## 2018-03-07 PROCEDURE — G0378 HOSPITAL OBSERVATION PER HR: HCPCS

## 2018-03-07 PROCEDURE — 80048 BASIC METABOLIC PNL TOTAL CA: CPT

## 2018-03-07 PROCEDURE — 97116 GAIT TRAINING THERAPY: CPT

## 2018-03-07 PROCEDURE — 83880 ASSAY OF NATRIURETIC PEPTIDE: CPT

## 2018-03-07 PROCEDURE — 96376 TX/PRO/DX INJ SAME DRUG ADON: CPT

## 2018-03-07 PROCEDURE — 97530 THERAPEUTIC ACTIVITIES: CPT

## 2018-03-07 PROCEDURE — 73564 X-RAY EXAM KNEE 4 OR MORE: CPT

## 2018-03-07 PROCEDURE — 97110 THERAPEUTIC EXERCISES: CPT

## 2018-03-07 PROCEDURE — 99285 EMERGENCY DEPT VISIT HI MDM: CPT

## 2018-03-07 PROCEDURE — 80053 COMPREHEN METABOLIC PANEL: CPT

## 2018-03-07 PROCEDURE — 96374 THER/PROPH/DIAG INJ IV PUSH: CPT

## 2018-03-07 SDOH — ECONOMIC STABILITY - HOUSING INSECURITY: HOMELESSNESS: Z59.0

## 2018-03-07 NOTE — RADRPT
EXAM DATE/TIME:  03/07/2018 21:07 

 

[facility] COMPARISON:     

No previous studies available for comparison.

 

                     

INDICATIONS :     

Right knee pain and swelling. No known trauma.

                     

 

MEDICAL HISTORY :            

Osteoarthritis. Rheumatoid arthritis. Gout.   

 

SURGICAL HISTORY :     

None.   

 

ENCOUNTER:     

Initial                                        

 

ACUITY:     

2 weeks      

 

PAIN SCORE:     

10/10

 

LOCATION:     

Right  Knee.

 

FINDINGS:     

Mild osteopenia.  Mild narrowing of the medial and lateral compartment without significant osteophyte
 formation.  There is chondrocalcinosis of the medial lateral meniscus.  There is distention of the s
uprapatellar soft tissues measuring up to 2.4 cm in thickness, characteristic of a moderate-sized kne
e effusion.  No radiopaque foreign bodies.  No fracture seen.

 

CONCLUSION:     

Findings characteristic of a moderately large knee effusion.  No fracture seen.  Degenerative changes
 with chondrocalcinosis menisci.

 

 

 

 Eliud Balderrama MD on March 07, 2018 at 21:30           

Board Certified Radiologist.

 This report was verified electronically.

## 2018-03-07 NOTE — PD
HPI


Chief Complaint:  Musculoskeletal Complaint


Time Seen by Provider:  20:26


Travel History


International Travel<30 days:  No


Contact w/Intl Traveler<30days:  No


Traveled to known affect area:  No





History of Present Illness


HPI


62-year-old male with history of rheumatoid arthritis, gout, atrial fibrillation

, CHF, hypertension, spinal stenosis, presents via EMS for evaluation of 

arthralgias.  Symptoms started 4-5 days ago.  He reports pain in both knees, 

MTP joint of both great toes, bilateral ankles.  The pain is an aching pain 

which is constant and worse with movement, ambulation.  He reports that he is 

currently homeless.  He reports that he tried to ambulate with crutches but has 

had significant difficulty secondary to the bilateral distribution of his pain.

  He reports that he has had similar pain in the past secondary to gout and 

rheumatoid arthritis and this feels similar.  He is not currently using any 

medication for symptom relief.  He reports that he is sexually active with one 

girlfriend on a long-term basis.  He denies any new sexual encounters.  He 

denies any urethral discharge.  He denies any history of gonorrhea.  He denies 

any fevers, chills.  He denies any myalgias.  He denies any pain in the thighs 

or the calves.  He denies any pain in the upper extremity joints.  He has no 

other complaints at this time.





PFSH


Past Medical History


Arthritis:  Yes (RA , OSTEOARTHRITIS)


Asthma:  Yes


Atrial Fibrillation:  Yes


Heart Rhythm Problems:  Yes (AFIB)


Cancer:  No


Cardiovascular Problems:  Yes


High Cholesterol:  Yes


Congestive Heart Failure:  Yes


Diminished Hearing:  No


Endocrine:  No


Gastrointestinal Disorders:  No


Genitourinary:  No


Hypertension:  Yes


Immune Disorder:  No


Implanted Vascular Access Dvce:  No


Musculoskeletal:  Yes (SPINAL STENOSIS, DEGENERATIVE JOINT DISEASE )


Neurologic:  No


Psychiatric:  No


Reproductive:  No


Respiratory:  Yes





Past Surgical History


Other Surgery:  Yes





Social History


Alcohol Use:  Yes ("COUPLE DRINKS PER WEEK")


Tobacco Use:  Yes (0.5 ppd)


Substance Use:  No





Allergies-Medications


(Allergen,Severity, Reaction):  


Coded Allergies:  


     No Known Allergies (Unverified  Allergy, Unknown, 2/26/18)


Reported Meds & Prescriptions





Reported Meds & Active Scripts


Active


Potassium Chloride ER (Potassium Chloride) 10 Meq Cap 10 Meq PO DAILY


Lasix (Furosemide) 20 Mg Tab 20 Mg PO DAILY


Lopressor (Metoprolol Tartrate) 50 Mg Tab 75 Mg PO Q12HR


Eliquis (Apixaban) 5 Mg Tab 5 Mg PO BID


Reported


Amlodipine (Amlodipine Besylate) 5 Mg Tab 5 Mg PO DAILY


Folic Acid 1 Mg Tablet 1 Mg PO DAILY


Methotrexate 2.5 Mg Tab 20 Mg PO Q7D


     Saturdays








Review of Systems


Except as stated in HPI:  all other systems reviewed are Neg





Physical Exam


Narrative


GENERAL: Well-developed well-nourished male in no acute distress


SKIN: Warm and dry.  No erythema, no open wounds


HEAD: Atraumatic. Normocephalic. 


EYES: Pupils equal and round. No scleral icterus. No injection or drainage. 


ENT: No nasal bleeding or discharge.  Mucous membranes pink and moist.


NECK: Trachea midline. No JVD. 


CARDIOVASCULAR: Regular rate and rhythm.  No murmur appreciated.


RESPIRATORY: No accessory muscle use. Clear to auscultation. Breath sounds 

equal bilaterally. 


GASTROINTESTINAL: Abdomen soft, non-tender, nondistended. Hepatic and splenic 

margins not palpable. 


MUSCULOSKELETAL: Bilateral knee joint effusions, some suprapatellar bursal 

swelling bilaterally.  There is generalized tenderness to palpation to 

bilateral knees, ankles, great toes.  There is no tenderness to palpation to 

the thighs, calves, feet.  There is no lower extremity edema.  2+ dorsalis 

pedis pulse bilaterally.  There is pain with range of motion activities of the 

lower extremity joints.


NEUROLOGICAL: Awake and alert. No obvious cranial nerve deficits.  Motor 

grossly within normal limits. Normal speech.





Data


Data


Last Documented VS





Vital Signs








  Date Time  Temp Pulse Resp B/P (MAP) Pulse Ox O2 Delivery O2 Flow Rate FiO2


 


3/7/18 23:00  72 16 141/72 (95) 97 Room Air  


 


3/7/18 18:07 98.1       








Orders





 Orders


Complete Blood Count With Diff (3/7/18 18:10)


Comprehensive Metabolic Panel (3/7/18 18:10)


B-Type Natriuretic Peptide (3/7/18 18:10)


Magnesium (Mg) (3/7/18 18:10)


Uric Acid (3/7/18 18:10)


Potassium Chloride (Kcl) (3/7/18 20:45)


Dexamethasone Inj (Decadron Inj) (3/7/18 20:45)


Ketorolac Inj (Toradol Inj) (3/7/18 20:45)


Acetamin-Hydrocod 325-5 Mg (Norco  5-325 (3/7/18 20:45)


Knee, Complete (4vws) (3/7/18 )


Knee, Complete (4vws) (3/7/18 )


Morphine Inj (Morphine Inj) (3/7/18 23:45)


Admit Order (Ed Use Only) (3/7/18 23:54)





Labs





Laboratory Tests








Test


  3/7/18


18:34


 


White Blood Count 14.5 TH/MM3 


 


Red Blood Count 4.49 MIL/MM3 


 


Hemoglobin 14.2 GM/DL 


 


Hematocrit 41.2 % 


 


Mean Corpuscular Volume 91.8 FL 


 


Mean Corpuscular Hemoglobin 31.6 PG 


 


Mean Corpuscular Hemoglobin


Concent 34.5 % 


 


 


Red Cell Distribution Width 14.0 % 


 


Platelet Count 344 TH/MM3 


 


Mean Platelet Volume 8.6 FL 


 


Neutrophils (%) (Auto) 76.8 % 


 


Lymphocytes (%) (Auto) 11.6 % 


 


Monocytes (%) (Auto) 10.5 % 


 


Eosinophils (%) (Auto) 0.3 % 


 


Basophils (%) (Auto) 0.8 % 


 


Neutrophils # (Auto) 11.1 TH/MM3 


 


Lymphocytes # (Auto) 1.7 TH/MM3 


 


Monocytes # (Auto) 1.5 TH/MM3 


 


Eosinophils # (Auto) 0.0 TH/MM3 


 


Basophils # (Auto) 0.1 TH/MM3 


 


CBC Comment DIFF FINAL 


 


Differential Comment  


 


Blood Urea Nitrogen 15 MG/DL 


 


Creatinine 1.29 MG/DL 


 


Random Glucose 95 MG/DL 


 


Total Protein 9.1 GM/DL 


 


Albumin 2.9 GM/DL 


 


Calcium Level 9.5 MG/DL 


 


Magnesium Level 1.8 MG/DL 


 


Uric Acid 4.9 MG/DL 


 


Alkaline Phosphatase 106 U/L 


 


Aspartate Amino Transf


(AST/SGOT) 24 U/L 


 


 


Alanine Aminotransferase


(ALT/SGPT) 22 U/L 


 


 


Total Bilirubin 1.1 MG/DL 


 


Sodium Level 133 MEQ/L 


 


Potassium Level 3.1 MEQ/L 


 


Chloride Level 95 MEQ/L 


 


Carbon Dioxide Level 30.3 MEQ/L 


 


Anion Gap 8 MEQ/L 


 


Estimat Glomerular Filtration


Rate 69 ML/MIN 


 


 


B-Type Natriuretic Peptide 27 PG/ML 











MDM


Medical Decision Making


Medical Screen Exam Complete:  Yes


Emergency Medical Condition:  Yes


Medical Record Reviewed:  Yes


Differential Diagnosis


Rheumatoid arthritis, polyarthralgia secondary to gout, doubt gonococcal septic 

arthritis, osteoarthritis


Narrative Course


62-year-old male with polyarthralgias in the bilateral lower extremity knees, 

ankles, great toes, similar to previous arthralgias that the patient has 

experienced.  He has a history of gouty arthritis in the past as well as 

rheumatoid arthritis.  I have reviewed his records.  He was here with similar 

pain in December 30, 2017.  He underwent knee joint aspiration at that time and 

there is no evidence of infectious process.  I suspect his pain is secondary to 

rheumatoid arthritis versus possibly gout although this is less likely given 

the generalized polyarthralgia distribution.  The plan will be to treat him 

symptomatically with Toradol, Decadron, Lortab here.


X-ray imaging reveals joint effusions.  Upon reexamination the patient's pain 

is still quite severe and he is unable to ambulate.  Therefore he will be given 

IV morphine and he will be admitted for observation for intractable pain.





Diagnosis





 Primary Impression:  


 Inability to ambulate due to multiple joints





Admitting Information


Admitting Physician Requests:  Observation











Franky Marcelo Mar 7, 2018 20:45

## 2018-03-07 NOTE — RADRPT
EXAM DATE/TIME:  03/07/2018 21:02 

 

[facility] COMPARISON:     

KNEE LEFT COMPLETE (4VWS), December 30, 2017, 14:40.

 

                     

INDICATIONS :     

Left knee pain and swelling. No known trauma.

                     

 

MEDICAL HISTORY :            

Osteoarthritis. Rheumatoid arthritis. Gout.   

 

SURGICAL HISTORY :     

None.   

 

ENCOUNTER:     

Initial                                        

 

ACUITY:     

2 weeks      

 

PAIN SCORE:     

10/10

 

LOCATION:     

Left  knee.

 

FINDINGS:     

4 view examination demonstrates narrowing of the medial compartment, chondrocalcinosis of the lateral
 meniscus, diffuse osteopenia and fullness in the suprapatellar region.  The degree of fullness in th
e suprapatellar region is less severe than 12/30/17.  No fracture seen.  Diffuse osteopenia.

 

CONCLUSION:     

1. Knee effusion, smaller than on prior examination 12/30/17.

2. No fracture seen.

 

 

 

 Eliud Balderrama MD on March 07, 2018 at 21:29           

Board Certified Radiologist.

 This report was verified electronically.

## 2018-03-08 VITALS
SYSTOLIC BLOOD PRESSURE: 108 MMHG | OXYGEN SATURATION: 98 % | HEART RATE: 59 BPM | DIASTOLIC BLOOD PRESSURE: 65 MMHG | RESPIRATION RATE: 16 BRPM

## 2018-03-08 VITALS
SYSTOLIC BLOOD PRESSURE: 108 MMHG | RESPIRATION RATE: 16 BRPM | HEART RATE: 87 BPM | OXYGEN SATURATION: 98 % | DIASTOLIC BLOOD PRESSURE: 68 MMHG

## 2018-03-08 VITALS
DIASTOLIC BLOOD PRESSURE: 70 MMHG | SYSTOLIC BLOOD PRESSURE: 111 MMHG | HEART RATE: 77 BPM | RESPIRATION RATE: 16 BRPM | OXYGEN SATURATION: 99 %

## 2018-03-08 VITALS
DIASTOLIC BLOOD PRESSURE: 73 MMHG | HEART RATE: 77 BPM | OXYGEN SATURATION: 99 % | RESPIRATION RATE: 16 BRPM | SYSTOLIC BLOOD PRESSURE: 113 MMHG

## 2018-03-08 VITALS
TEMPERATURE: 98.6 F | RESPIRATION RATE: 18 BRPM | HEART RATE: 65 BPM | OXYGEN SATURATION: 98 % | DIASTOLIC BLOOD PRESSURE: 63 MMHG | SYSTOLIC BLOOD PRESSURE: 106 MMHG

## 2018-03-08 VITALS
DIASTOLIC BLOOD PRESSURE: 69 MMHG | RESPIRATION RATE: 18 BRPM | TEMPERATURE: 97.6 F | OXYGEN SATURATION: 97 % | SYSTOLIC BLOOD PRESSURE: 130 MMHG | HEART RATE: 61 BPM

## 2018-03-08 VITALS
OXYGEN SATURATION: 99 % | SYSTOLIC BLOOD PRESSURE: 126 MMHG | HEART RATE: 85 BPM | TEMPERATURE: 97.8 F | RESPIRATION RATE: 18 BRPM | DIASTOLIC BLOOD PRESSURE: 74 MMHG

## 2018-03-08 VITALS
OXYGEN SATURATION: 98 % | DIASTOLIC BLOOD PRESSURE: 67 MMHG | HEART RATE: 97 BPM | SYSTOLIC BLOOD PRESSURE: 114 MMHG | RESPIRATION RATE: 16 BRPM

## 2018-03-08 RX ADMIN — FUROSEMIDE SCH MG: 20 TABLET ORAL at 08:41

## 2018-03-08 RX ADMIN — METOPROLOL TARTRATE SCH MG: 25 TABLET, FILM COATED ORAL at 20:49

## 2018-03-08 RX ADMIN — METOPROLOL TARTRATE SCH MG: 25 TABLET, FILM COATED ORAL at 09:00

## 2018-03-08 RX ADMIN — APIXABAN SCH MG: 5 TABLET, FILM COATED ORAL at 20:49

## 2018-03-08 RX ADMIN — CLONIDINE HYDROCHLORIDE SCH MG: 0.1 INJECTION, SOLUTION EPIDURAL at 17:37

## 2018-03-08 RX ADMIN — Medication SCH ML: at 20:50

## 2018-03-08 RX ADMIN — POTASSIUM CHLORIDE SCH MEQ: 750 CAPSULE, EXTENDED RELEASE ORAL at 08:41

## 2018-03-08 RX ADMIN — Medication SCH ML: at 10:44

## 2018-03-08 RX ADMIN — HYDROCODONE BITARTRATE AND ACETAMINOPHEN PRN TAB: 7.5; 325 TABLET ORAL at 17:36

## 2018-03-08 RX ADMIN — HYDROCODONE BITARTRATE AND ACETAMINOPHEN PRN TAB: 7.5; 325 TABLET ORAL at 06:39

## 2018-03-08 RX ADMIN — APIXABAN SCH MG: 5 TABLET, FILM COATED ORAL at 08:40

## 2018-03-08 NOTE — HHI.HP
__________________________________________________





HPI


Service


[facility]


Primary Care Physician


Dr. Calvillo


Admission Diagnosis





Intractable pain, arthralgias


Diagnoses:  


(1) Knee effusion, left


(2) Knee effusion, right


(3) Inability to ambulate due to multiple joints


Chief Complaint:  


bilateral knee, ankle, and foot pain with inability to ambulate


Travel History


International Travel<30 Days:  No


Contact w/Intl Traveler <30 Da:  No


Traveled to Known Affected Are:  No


History of Present Illness


Mr Kowalski is a 62 year-old male with a history of osteoarthritis, rheumatoid 

arthritis, gout, atrial fibrillation on Eliquis, asthma, hypertension, spinal 

stenosis, and tobacco abuse who presented to the ED on 3/7/18 complaining of 

inability to ambulate and multiple arthralgias.  Despite analgesics provided in 

the ED, the patient remained nonambulatory due to pain and bilateral knee xrays 

demonstrate knee effusions.  The patient is admitted to [facility] for intractable 

pain with immobility.





The patient is seen in the ER.  He is complaining of severe bilateral knee pain

, ankle pain, and foot pain.  He states his symptoms have been present for a 

couple of days but when it became so severe that he could not longer ambulate, 

he decided to seek emergency medical treatment.  He reports aching and sharp 

pain rated 10/10.  He does appear to be in obvious pain when attempting to move 

on the ED stretcher.  He has palpable and tender bilateral knee effusions.  His 

states that his left leg is rotating externally as a result of his knee pain.  

He is tender to knees, ankles, and foot palpation.  Denies fevers, nausea, 

vomiting, diarrhea, chest pain, shortness of breath.  Reports chills a couple 

of days ago and none since until feeling cold at the hospital.  Afebrile here.


.





Review of Systems


Except as stated in HPI:  all other systems reviewed are Neg





Past Family Social History


Past Medical History


osteoarthritis, rheumatoid arthritis, gout, atrial fibrillation on Eliquis, 

asthma, hypertension, spinal stenosis, and tobacco abuse


.


Past Surgical History


back surgeries for spinal stenosis x 2016 was most recent


Bilateral knee arthroscopies


.


Reported Medications





Reported Meds & Active Scripts


Active


Potassium Chloride ER (Potassium Chloride) 10 Meq Cap 10 Meq PO DAILY


Lasix (Furosemide) 20 Mg Tab 20 Mg PO DAILY


Lopressor (Metoprolol Tartrate) 50 Mg Tab 75 Mg PO Q12HR


Eliquis (Apixaban) 5 Mg Tab 5 Mg PO BID


Reported


Amlodipine (Amlodipine Besylate) 5 Mg Tab 5 Mg PO DAILY


Folic Acid 1 Mg Tablet 1 Mg PO DAILY


Methotrexate 2.5 Mg Tab 20 Mg PO Q7D


     


.


Allergies:  


Coded Allergies:  


     No Known Allergies (Unverified  Allergy, Unknown, 18)


Family History


Mother with DM


Father with Alzheimer's


.


Social History


Tobacco: smokes 1/2 PPD for 15 years


ETOH: drinks 1 - 2 drinks socially occasionally


Illicit Drugs: denies


.





Physical Exam


Vital Signs





Vital Signs








  Date Time  Temp Pulse Resp B/P (MAP) Pulse Ox O2 Delivery O2 Flow Rate FiO2


 


3/7/18 23:00  72 16 141/72 (95) 97 Room Air  


 


3/7/18 18:07 98.1 57 18 116/74 (88) 96   








Physical Exam





Constitutional: This is a pleasant but slightly unkempt overweight male patient

, in no apparent distress.


Integumentary: No rashes. Cool and dry.


HEAD: Atraumatic. Normocephalic. 


EYES: No scleral icterus. No injection or drainage. 


ENT: Nose without bleeding, purulent drainage. 


NECK: Trachea midline. No JVD. 


CARDIOVASCULAR: Regular rate and rhythm without murmurs, gallops, or rubs. 


RESPIRATORY: Clear to auscultation. Breath sounds equal bilaterally. No wheezes

, rales, or rhonchi.  


GASTROINTESTINAL: Abdomen soft, non-tender, nondistended. No guarding.


MUSCULOSKELETAL: Right knee with palpable effusion; left knee externally 

rotated with palpable effusion.  Significant pain with palpation of effusion, 

significant pain with touch of bilateral feet and ankles.  Generalized 

weakness.  No calf tenderness. 


NEUROLOGICAL: Awake and alert.  Normal speech.


.


Laboratory





Laboratory Tests








Test


  3/7/18


18:34


 


White Blood Count 14.5 


 


Red Blood Count 4.49 


 


Hemoglobin 14.2 


 


Hematocrit 41.2 


 


Mean Corpuscular Volume 91.8 


 


Mean Corpuscular Hemoglobin 31.6 


 


Mean Corpuscular Hemoglobin


Concent 34.5 


 


 


Red Cell Distribution Width 14.0 


 


Platelet Count 344 


 


Mean Platelet Volume 8.6 


 


Neutrophils (%) (Auto) 76.8 


 


Lymphocytes (%) (Auto) 11.6 


 


Monocytes (%) (Auto) 10.5 


 


Eosinophils (%) (Auto) 0.3 


 


Basophils (%) (Auto) 0.8 


 


Neutrophils # (Auto) 11.1 


 


Lymphocytes # (Auto) 1.7 


 


Monocytes # (Auto) 1.5 


 


Eosinophils # (Auto) 0.0 


 


Basophils # (Auto) 0.1 


 


CBC Comment DIFF FINAL 


 


Differential Comment  


 


Blood Urea Nitrogen 15 


 


Creatinine 1.29 


 


Random Glucose 95 


 


Total Protein 9.1 


 


Albumin 2.9 


 


Calcium Level 9.5 


 


Magnesium Level 1.8 


 


Uric Acid 4.9 


 


Alkaline Phosphatase 106 


 


Aspartate Amino Transf


(AST/SGOT) 24 


 


 


Alanine Aminotransferase


(ALT/SGPT) 22 


 


 


Total Bilirubin 1.1 


 


Sodium Level 133 


 


Potassium Level 3.1 


 


Chloride Level 95 


 


Carbon Dioxide Level 30.3 


 


Anion Gap 8 


 


Estimat Glomerular Filtration


Rate 69 


 


 


B-Type Natriuretic Peptide 27 








Result Diagram:  


3/7/18 1834                                                                    

            3/7/18 1834





Imaging





Last Impressions








Knee X-Ray 3/7/18 0000 Signed





Impressions: 





 Service Date/Time:  2018 21:07 - CONCLUSION:  Findings 





 characteristic of a moderately large knee effusion.  No fracture seen.  





 Degenerative changes with chondrocalcinosis menisci.     Eliud Balderrama MD 











Caprini VTE Risk Assessment


Caprini VTE Risk Assessment:  Mod/High Risk (score >= 2)


Caprini Risk Assessment Model











 Point Value = 1          Point Value = 2  Point Value = 3  Point Value = 5


 


Age 41-60


Minor surgery


BMI > 25 kg/m2


Swollen legs


Varicose veins


Pregnancy or postpartum


History of unexplained or recurrent


   spontaneous 


Oral contraceptives or hormone


   replacement


Sepsis (< 1 month)


Serious lung disease, including


   pneumonia (< 1 month)


Abnormal pulmonary function


Acute myocardial infarction


Congestive heart failure (< 1 month)


History of inflammatory bowel disease


Medical patient at bed rest Age 61-74


Arthroscopic surgery


Major open surgery (> 45 min)


Laparoscopic surgery (> 45 min)


Malignancy


Confined to bed (> 72 hours)


Immobilizing plaster cast


Central venous access Age >= 75


History of VTE


Family history of VTE


Factor V Leiden


Prothrombin 22944T


Lupus anticoagulant


Anticardiolipin antibodies


Elevated serum homocysteine


Heparin-induced thrombocytopenia


Other congenital or acquired


   thrombophilia Stroke (< 1 month)


Elective arthroplasty


Hip, pelvis, or leg fracture


Acute spinal cord injury (< 1 month)








Prophylaxis Regimen











   Total Risk


Factor Score Risk Level Prophylaxis Regimen


 


0-1      Low Early ambulation


 


2 Moderate Order ONE of the following:


*Sequential Compression Device (SCD)


*Heparin 5000 units SQ BID


 


3-4 Higher Order ONE of the following medications:


*Heparin 5000 units SQ TID


*Enoxaparin/Lovenox 40 mg SQ daily (WT < 150 kg, CrCl > 30 mL/min)


*Enoxaparin/Lovenox 30 mg SQ daily (WT < 150 kg, CrCl > 10-29 mL/min)


*Enoxaparin/Lovenox 30 mg SQ BID (WT < 150 kg, CrCl > 30 mL/min)


AND/OR


*Sequential Compression Device (SCD)


 


5 or more Highest Order ONE of the following medications:


*Heparin 5000 units SQ TID (Preferred with Epidurals)


*Enoxaparin/Lovenox 40 mg SQ daily (WT < 150 kg, CrCl > 30 mL/min)


*Enoxaparin/Lovenox 30 mg SQ daily (WT < 150 kg, CrCl > 10-29 mL/min)


*Enoxaparin/Lovenox 30 mg SQ BID (WT < 150 kg, CrCl > 30 mL/min)


AND


*Sequential Compression Device (SCD)











Assessment and Plan


Problem List:  


(1) Inability to ambulate due to multiple joints


ICD Code:  R26.2 - Difficulty in walking, not elsewhere classified


(2) Knee effusion, left


ICD Code:  M25.462 - Effusion, left knee


(3) Knee effusion, right


ICD Code:  M25.461 - Effusion, right knee


Assessment and Plan


Mr Kowalski is a 62 year-old male with a history of osteoarthritis, rheumatoid 

arthritis, gout, atrial fibrillation on Eliquis, asthma, hypertension, spinal 

stenosis, and tobacco abuse who presented to the ED on 3/7/18 complaining of 

inability to ambulate and multiple arthralgias.  Despite analgesics provided in 

the ED, the patient remained nonambulatory due to pain and bilateral knee xrays 

demonstrate knee effusions.  The patient is admitted to [facility] for intractable 

pain with immobility.





Bilateral knee effusions with intractable pain and inability to ambulate


- consult orthopedic surgery - appreciate assistance


- Norco 7.5/325 mg p.o. q4h PRN pain


- consult physical therapy


- received Decadron 8 mg IV in ED





Leukocytosis - infection vs stress response 


- WBC 14.5 with neutrophilia; patient is afebrile


- repeat CBC in a.m and follow results





Hypokalemia


- K+ 3.1 on admission


- replaced potassium


- recheck BMP in a.m. and monitor levels and replace as needed





Atrial fibrillation


CHF


- continue Eliquis, metoprolol, lasix, potassium


- monitor I and Os


- continuous cardiac telemetry to monitor for cardiac arrhythmia





Tobacco abuse


- counselled regarding cessation





DVT prophylaxis


- continue home Eliquis


.


Discussed Condition With


patient and Dr. Juarez


.











Shraddha Goss Mar 8, 2018 01:25

## 2018-03-08 NOTE — HHI.PR
Subjective


Remarks


Follow up for joint pain, knee effusions. The patient reports continued diffuse 

joint pain and swelling of bilateral knees. Denies fevers/chills. Denies any 

other medical complaints. He is requesting to speak with . He is 

from [place], now residing in [facility]. He was staying at a hotel prior to 

hospitalization. He follows with Dr. Eliud GARCIA and also has established with a 

cardiologist. He reports compliance with his methotrexate and Eliquis.





Objective


Vitals





Vital Signs








  Date Time  Temp Pulse Resp B/P (MAP) Pulse Ox O2 Delivery O2 Flow Rate FiO2


 


3/8/18 06:00  77 16 113/73 (86) 99 Room Air  


 


3/8/18 05:00  77 16 111/70 (84) 99 Room Air  


 


3/8/18 03:00  87 16 108/68 (81) 98 Room Air  


 


3/8/18 02:00  59 16 108/65 (79) 98 Room Air  


 


3/8/18 00:00  97 16 114/67 (83) 98 Room Air  


 


3/7/18 23:00  72 16 141/72 (95) 97 Room Air  


 


3/7/18 18:07 98.1 57 18 116/74 (88) 96   








Result Diagram:  


3/7/18 1834                                                                    

            3/7/18 1834





Imaging





Last Impressions








Knee X-Ray 3/7/18 0000 Signed





Impressions: 





 Service Date/Time:  Wednesday, March 7, 2018 21:07 - CONCLUSION:  Findings 





 characteristic of a moderately large knee effusion.  No fracture seen.  





 Degenerative changes with chondrocalcinosis menisci.     Eliud Balderrama MD 








Objective Remarks


GENERAL: Well-nourished, well-developed middle aged male patient in [facility].


SKIN: Warm and dry. No rash.


HEENT:  Normocephalic. Atraumatic. Pupils equal and round. Mucous membranes 

pink and moist.


CARDIOVASCULAR: Irregular rate and rhythm.  S1, S2 noted. No murmur 

appreciated. 


RESPIRATORY: No accessory muscle use. Clear to auscultation. Breath sounds 

equal bilaterally.  


GASTROINTESTINAL: Abdomen soft, non-tender, nondistended. Normoactive bowel 

sounds x4.


MUSCULOSKELETAL: No obvious deformities. Bilateral knees with effusions, tender 

to palpation, however no erythema/warmth. 


NEUROLOGICAL: Awake and alert. No obvious cranial nerve deficits.  Motor 

grossly within normal limits. 5/5 muscle strength in bilateral upper and lower 

extremities.  Normal speech.


PSYCHIATRIC: Appropriate mood and affect; insight and judgment normal.


Medications and IVs





Current Medications








 Medications


  (Trade)  Dose


 Ordered  Sig/Desire


 Route  Start Time


 Stop Time Status Last Admin


 


  (NS Flush)  2 ml  UNSCH  PRN


 IV FLUSH  3/8/18 01:30


     


 


 


  (NS Flush)  2 ml  BID


 IV FLUSH  3/8/18 09:00


    3/8/18 10:44


 


 


  (Tylenol)  650 mg  Q4H  PRN


 PO  3/8/18 01:30


     


 


 


  (Zofran Inj)  4 mg  Q6H  PRN


 IVP  3/8/18 01:30


     


 


 


  (Narcan Inj)  0.4 mg  UNSCH  PRN


 IV PUSH  3/8/18 01:30


     


 


 


  (Norco  7.5-325


 Mg)  1 tab  Q4H  PRN


 PO  3/8/18 01:30


    3/8/18 06:39


 


 


  (Norvasc)  5 mg  DAILY


 PO  3/8/18 09:00


    3/8/18 08:41


 


 


  (Eliquis)  5 mg  BID


 PO  3/8/18 09:00


    3/8/18 08:40


 


 


  (Lasix)  20 mg  DAILY


 PO  3/8/18 09:00


    3/8/18 08:41


 


 


  (Lopressor)  75 mg  Q12HR


 PO  3/8/18 09:00


     


 


 


  (KCl)  10 meq  DAILY


 PO  3/8/18 09:00


    3/8/18 08:41


 











A/P


Problem List:  


(1) Knee effusion, left


ICD Code:  M25.462 - Effusion, left knee


(2) Knee effusion, right


ICD Code:  M25.461 - Effusion, right knee


(3) Inability to ambulate due to multiple joints


ICD Code:  R26.2 - Difficulty in walking, not elsewhere classified


Assessment and Plan


62-year-old male with a history of osteoarthritis, rheumatoid arthritis, gout, 

atrial fibrillation on Eliquis, asthma, hypertension, spinal stenosis, and 

tobacco abuse who presented to the ED on 3/7/18 complaining of inability to 

ambulate and multiple arthralgias.  Despite analgesics provided in the ED, the 

patient remained nonambulatory due to pain and bilateral knee xrays demonstrate 

knee effusions.  The patient is admitted to [facility] for intractable pain with 

immobility.





Bilateral knee effusions with intractable pain and inability to ambulate


   - consult orthopedic surgery - appreciate assistance


   - Continue Norco 7.5/325 mg p.o. q4h PRN pain


   - consult physical therapy, recommends walker vs wheelchair


   - given Decadron 8 mg IV x1 in ED


   - continue daily physical therapy


   - will give IV Toradol 15mg q6h [id number]


   - check ESR/CRP


   - case management to assist with discharge planning





Leukocytosis: suspect stress response, no signs of infection. WBC 14.5 with 

neutrophilia; patient is afebrile


   - repeat CBC, however patient did receive steroids and WBC may increase





Hypokalemia:  K+ 3.1 on admission


   - replaced potassium


   - recheck BMP in a.m. and monitor levels and replace as needed





Atrial fibrillation, CHF: chronic, stable


   - continue Eliquis, metoprolol, lasix, potassium


   - monitor I and Os


   - continuous cardiac telemetry to monitor for cardiac arrhythmia





Tobacco abuse


   - counselled regarding cessation





DVT prophylaxis- on Eliquis


Discharge Planning


Hopefully discharge tomorrow. Case management to assist with discharge planning.











Neelam Kolb PA-C Mar 8, 2018 12:59 pm

## 2018-03-08 NOTE — PD.CONS
__________________________________________________





HPI


Service


Orthopedic Surgeons


Consult Requested By


RYLAN Martinez


Reason for Consult


Bilateral knee swelling with arthritis


Primary Care Physician


No Primary Care Physician


Admission Diagnosis





Intractable pain, arthralgias


Diagnoses:  


(1) Knee effusion, left


Diagnosis:  Principal





(2) Knee effusion, right


Diagnosis:  Principal





(3) Inability to ambulate due to multiple joints


Chief Complaint:  


Bilateral knee pain and difficulty with ambulation


History of Present Illness


This is a 62-year-old  male.  He has had a history of bilateral 

knee pain and swelling for many years.  He lived in [place] until about a 

year ago.  She received cortisone injection in both of his disease.  He was 

told at one time that he had gout.  He also is being treated for rheumatoid 

arthritis and receives methotrexate injections.  The patient has had increasing 

swelling of both knees over period of several weeks.  He states that he has had 

difficulty with ambulation because of bilateral knee pain.  He takes chronic 

narcotics for chronic knee pain and other pain issues.  He presented to the 

emergency room.  The patient be admitted for observation.  I have been asked to 

see him in consultation regarding the same





Review of Systems


Constitutional:  DENIES: Diaphoretic episodes, Fatigue, Fever, Weight gain, 

Weight loss, Chills, Dizziness, Change in appetite, Night Sweats


Endocrine:  DENIES: Heat/cold intolerance, Polydipsia, Polyuria, Polyphagia


Eyes:  DENIES: Blurred vision, Diplopia, Eye inflammation, Eye pain, Vision loss

, Photosensitivity, Double Vision


Ears, nose, mouth, throat:  DENIES: Tinnitus, Hearing loss, Vertigo, Nasal 

discharge, Oral lesions, Throat pain, Hoarseness, Ear Pain, Running Nose, 

Epistaxis, Sinus Pain, Toothache, Odynophagia


Respiratory:  DENIES: Apneas, Cough, Snoring, Wheezing, Hemoptysis, Sputum 

production, Shortness of breath


Cardiovascular:  DENIES: Chest pain, Palpitations, Syncope, Dyspnea on Exertion

, PND, Lower Extremity Edema, Orthopnea, Claudication


Gastrointestinal:  DENIES: Abdominal pain, Black stools, Bloody stools, 

Constipation, Diarrhea, Nausea, Vomiting, Difficulty Swallowing, Anorexia


Genitourinary:  DENIES: Sexual dysfunction, Urinary frequency, Urinary 

incontinence, Urgency, Hematuria, Dysuria, Nocturia, Penile Discharge, 

Testicular Pain, Testicular Swelling


Musculoskeletal:  COMPLAINS OF: Joint pain, Muscle aches, Stiffness, Joint 

Swelling, Back pain


Integumentary:  DENIES: Abnormal pigmentation, Nail changes, Pruritus, Rash


Hematologic/lymphatic:  DENIES: Bruising, Lymphadenopathy


Immunologic/allergic:  DENIES: Eczema, Urticaria


Neurologic:  COMPLAINS OF: Abnormal gait, DENIES: Headache, Localized weakness, 

Paresthesias, Seizures, Speech Problems, Tremor, Poor Balance


Psychiatric:  DENIES: Anxiety, Confusion, Mood changes, Depression, 

Hallucinations, Agitation, Suicidal Ideation, Homicidal Ideation, Delusions





Past Family Social History


Past Medical History


osteoarthritis, rheumatoid arthritis, gout, atrial fibrillation on Eliquis, 

asthma, hypertension, spinal stenosis, and tobacco abuse


.


Past Surgical History


back surgeries for spinal stenosis x 2, 2016 was most recent


Bilateral knee arthroscopies


.


Allergies:  


Coded Allergies:  


     No Known Allergies (Unverified  Allergy, Unknown, 2/26/18)


Active Ordered Medications





Current Medications








 Medications


  (Trade)  Dose


 Ordered  Sig/Desire


 Route  Start Time


 Stop Time Status Last Admin


 


  (NS Flush)  2 ml  UNSCH  PRN


 IV FLUSH  3/8/18 01:30


     


 


 


  (NS Flush)  2 ml  BID


 IV FLUSH  3/8/18 09:00


     


 


 


  (Tylenol)  650 mg  Q4H  PRN


 PO  3/8/18 01:30


     


 


 


  (Zofran Inj)  4 mg  Q6H  PRN


 IVP  3/8/18 01:30


     


 


 


  (Narcan Inj)  0.4 mg  UNSCH  PRN


 IV PUSH  3/8/18 01:30


     


 


 


  (Norco  7.5-325


 Mg)  1 tab  Q4H  PRN


 PO  3/8/18 01:30


    3/8/18 06:39


 


 


  (Norvasc)  5 mg  DAILY


 PO  3/8/18 09:00


     


 


 


  (Eliquis)  5 mg  BID


 PO  3/8/18 09:00


     


 


 


  (Lasix)  20 mg  DAILY


 PO  3/8/18 09:00


     


 


 


  (Lopressor)  75 mg  Q12HR


 PO  3/8/18 09:00


     


 


 


  (KCl)  10 meq  DAILY


 PO  3/8/18 09:00


     


 








Reported Meds & Active Scripts


Active


Potassium Chloride ER (Potassium Chloride) 10 Meq Cap 10 Meq PO DAILY


Lasix (Furosemide) 20 Mg Tab 20 Mg PO DAILY


Lopressor (Metoprolol Tartrate) 50 Mg Tab 75 Mg PO Q12HR


Eliquis (Apixaban) 5 Mg Tab 5 Mg PO BID


Reported


Amlodipine (Amlodipine Besylate) 5 Mg Tab 5 Mg PO DAILY


Folic Acid 1 Mg Tablet 1 Mg PO DAILY


Methotrexate 2.5 Mg Tab 20 Mg PO Q7D


     Saturdays


Family History


Mother with DM


Father with Alzheimer's


.


Social History


Tobacco: smokes 1/2 PPD for 15 years


ETOH: drinks 1 - 2 drinks socially occasionally


Illicit Drugs: denies


.





Physical Exam


Vital Signs





Vital Signs








  Date Time  Temp Pulse Resp B/P (MAP) Pulse Ox O2 Delivery O2 Flow Rate FiO2


 


3/8/18 06:00  77 16 113/73 (86) 99 Room Air  


 


3/8/18 05:00  77 16 111/70 (84) 99 Room Air  


 


3/8/18 03:00  87 16 108/68 (81) 98 Room Air  


 


3/8/18 02:00  59 16 108/65 (79) 98 Room Air  


 


3/8/18 00:00  97 16 114/67 (83) 98 Room Air  


 


3/7/18 23:00  72 16 141/72 (95) 97 Room Air  


 


3/7/18 18:07 98.1 57 18 116/74 (88) 96   








Physical Exam


HEENT: Normocephalic atraumatic pupils equal round reactive.


NECK: Supple.  No abnormal masses.  Full range of motion.


CHEST: Clear to auscultation with no rales or rhonchi's or wheezes.


HEART: Regular rate and rhythm.  No murmurs.


ABDOMEN: Soft, nontender, no masses.  Normal active bowel sounds.


GENITOURINARY: Deferred.


MUSCULOSKELETAL: Bilateral knees are similar.  Both have a mild effusion.  

Slightly greater on the left knee than the right knee.  Range of motion limited 

to extension 0 flexion 45 or 50.  Pain with range of motion.  No warmth.  No 

redness.  No instability.  Mild varus orientation.  Dorsalis pedis 2+.  Skin no 

skin changes.  He tends to hold the left leg in an external rotated position 

but the knee isn't anatomic alignment other than mild varus.


Laboratory





Laboratory Tests








Test


  3/7/18


18:34


 


White Blood Count 14.5 


 


Red Blood Count 4.49 


 


Hemoglobin 14.2 


 


Hematocrit 41.2 


 


Mean Corpuscular Volume 91.8 


 


Mean Corpuscular Hemoglobin 31.6 


 


Mean Corpuscular Hemoglobin


Concent 34.5 


 


 


Red Cell Distribution Width 14.0 


 


Platelet Count 344 


 


Mean Platelet Volume 8.6 


 


Neutrophils (%) (Auto) 76.8 


 


Lymphocytes (%) (Auto) 11.6 


 


Monocytes (%) (Auto) 10.5 


 


Eosinophils (%) (Auto) 0.3 


 


Basophils (%) (Auto) 0.8 


 


Neutrophils # (Auto) 11.1 


 


Lymphocytes # (Auto) 1.7 


 


Monocytes # (Auto) 1.5 


 


Eosinophils # (Auto) 0.0 


 


Basophils # (Auto) 0.1 


 


CBC Comment DIFF FINAL 


 


Differential Comment  


 


Blood Urea Nitrogen 15 


 


Creatinine 1.29 


 


Random Glucose 95 


 


Total Protein 9.1 


 


Albumin 2.9 


 


Calcium Level 9.5 


 


Magnesium Level 1.8 


 


Uric Acid 4.9 


 


Alkaline Phosphatase 106 


 


Aspartate Amino Transf


(AST/SGOT) 24 


 


 


Alanine Aminotransferase


(ALT/SGPT) 22 


 


 


Total Bilirubin 1.1 


 


Sodium Level 133 


 


Potassium Level 3.1 


 


Chloride Level 95 


 


Carbon Dioxide Level 30.3 


 


Anion Gap 8 


 


Estimat Glomerular Filtration


Rate 69 


 


 


B-Type Natriuretic Peptide 27 








Result Diagram:  


3/7/18 1834                                                                    

            3/7/18 1834





Imaging


Review of x-rays and review of the radiologist's interpretation shows evidence 

of calcification the meniscus of both knees.  Some chronic changes are seen 

consistent with long-standing osteoarthritis.  Mild inflammatory changes are 

noted.  No fracture.  No instability.





Assessment & Plan


Assessment and Plan


History of rheumatoid arthritis.


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 

consistent with calcification of the meniscus seen commonly in both conditions)


Knee effusion, related to underlying polyarthropathy





PLAN:


This patient ultimately will be a candidate for bilateral total knee 

replacement arthroplasty.


Resist cortisone injection at this time.  That should be avoided within 6 

months prior to elective total joint replacement.


The patient needs to get his social situation under control fit so that he 

becomes a candidate for joint replacement surgery.


Continue with oral medications for control of pain and for anti-inflammation.  

The patient is on methotrexate.  Consider NSAIDs for treatment of underlying 

degenerative changes in addition to inflammatory changes.


This patient should be followed by rheumatology for more appropriate management 

of nonsurgical care of his underlying rheumatoid arthritis/crystalline 

arthropathy.


Weightbearing as tolerated.


Use of walker and or wheelchair as necessary.


Follow-up in approximately 4 weeks in the office for reevaluation and further 

recommendations from an orthopedic surgical standpoint











Mirza Roberts MD Mar 8, 2018 06:55

## 2018-03-09 VITALS
TEMPERATURE: 98 F | SYSTOLIC BLOOD PRESSURE: 119 MMHG | OXYGEN SATURATION: 97 % | RESPIRATION RATE: 18 BRPM | HEART RATE: 62 BPM | DIASTOLIC BLOOD PRESSURE: 73 MMHG

## 2018-03-09 VITALS
DIASTOLIC BLOOD PRESSURE: 71 MMHG | TEMPERATURE: 97.4 F | OXYGEN SATURATION: 97 % | HEART RATE: 60 BPM | RESPIRATION RATE: 20 BRPM | SYSTOLIC BLOOD PRESSURE: 112 MMHG

## 2018-03-09 VITALS
SYSTOLIC BLOOD PRESSURE: 113 MMHG | TEMPERATURE: 98.4 F | HEART RATE: 61 BPM | RESPIRATION RATE: 18 BRPM | DIASTOLIC BLOOD PRESSURE: 66 MMHG | OXYGEN SATURATION: 99 %

## 2018-03-09 VITALS — HEART RATE: 75 BPM

## 2018-03-09 VITALS
HEART RATE: 62 BPM | DIASTOLIC BLOOD PRESSURE: 71 MMHG | SYSTOLIC BLOOD PRESSURE: 119 MMHG | OXYGEN SATURATION: 94 % | TEMPERATURE: 98 F | RESPIRATION RATE: 18 BRPM

## 2018-03-09 VITALS
RESPIRATION RATE: 18 BRPM | DIASTOLIC BLOOD PRESSURE: 73 MMHG | SYSTOLIC BLOOD PRESSURE: 118 MMHG | HEART RATE: 70 BPM | OXYGEN SATURATION: 95 % | TEMPERATURE: 97.6 F

## 2018-03-09 VITALS
OXYGEN SATURATION: 96 % | TEMPERATURE: 97.4 F | SYSTOLIC BLOOD PRESSURE: 106 MMHG | HEART RATE: 73 BPM | RESPIRATION RATE: 18 BRPM | DIASTOLIC BLOOD PRESSURE: 67 MMHG

## 2018-03-09 VITALS — HEART RATE: 74 BPM

## 2018-03-09 LAB
BASOPHILS # BLD AUTO: 0 TH/MM3 (ref 0–0.2)
BASOPHILS NFR BLD: 0.2 % (ref 0–2)
BUN SERPL-MCNC: 28 MG/DL (ref 7–18)
CALCIUM SERPL-MCNC: 9.1 MG/DL (ref 8.5–10.1)
CHLORIDE SERPL-SCNC: 100 MEQ/L (ref 98–107)
CREAT SERPL-MCNC: 0.98 MG/DL (ref 0.6–1.3)
CRP SERPL-MCNC: 17 MG/DL (ref 0–0.3)
EOSINOPHIL # BLD: 0 TH/MM3 (ref 0–0.4)
EOSINOPHIL NFR BLD: 0.2 % (ref 0–4)
ERYTHROCYTE [DISTWIDTH] IN BLOOD BY AUTOMATED COUNT: 14.2 % (ref 11.6–17.2)
ERYTHROCYTE [SEDIMENTATION RATE] IN BLOOD BY WESTERGREN METHOD: 90 MM/HR (ref 0–20)
GFR SERPLBLD BASED ON 1.73 SQ M-ARVRAT: 94 ML/MIN (ref 89–?)
GLUCOSE SERPL-MCNC: 90 MG/DL (ref 74–106)
HCO3 BLD-SCNC: 25.5 MEQ/L (ref 21–32)
HCT VFR BLD CALC: 34.8 % (ref 39–51)
HGB BLD-MCNC: 12.1 GM/DL (ref 13–17)
LYMPHOCYTES # BLD AUTO: 1.6 TH/MM3 (ref 1–4.8)
LYMPHOCYTES NFR BLD AUTO: 7.9 % (ref 9–44)
MCH RBC QN AUTO: 31.5 PG (ref 27–34)
MCHC RBC AUTO-ENTMCNC: 34.7 % (ref 32–36)
MCV RBC AUTO: 90.8 FL (ref 80–100)
MONOCYTE #: 1.1 TH/MM3 (ref 0–0.9)
MONOCYTES NFR BLD: 5.5 % (ref 0–8)
NEUTROPHILS # BLD AUTO: 17.2 TH/MM3 (ref 1.8–7.7)
NEUTROPHILS NFR BLD AUTO: 86.2 % (ref 16–70)
PLATELET # BLD: 357 TH/MM3 (ref 150–450)
PMV BLD AUTO: 8.6 FL (ref 7–11)
RBC # BLD AUTO: 3.83 MIL/MM3 (ref 4.5–5.9)
SODIUM SERPL-SCNC: 135 MEQ/L (ref 136–145)
WBC # BLD AUTO: 19.9 TH/MM3 (ref 4–11)

## 2018-03-09 RX ADMIN — CLONIDINE HYDROCHLORIDE SCH MG: 0.1 INJECTION, SOLUTION EPIDURAL at 18:13

## 2018-03-09 RX ADMIN — HYDROCODONE BITARTRATE AND ACETAMINOPHEN PRN TAB: 7.5; 325 TABLET ORAL at 01:52

## 2018-03-09 RX ADMIN — APIXABAN SCH MG: 5 TABLET, FILM COATED ORAL at 20:41

## 2018-03-09 RX ADMIN — APIXABAN SCH MG: 5 TABLET, FILM COATED ORAL at 08:32

## 2018-03-09 RX ADMIN — Medication SCH ML: at 20:43

## 2018-03-09 RX ADMIN — Medication PRN ML: at 06:10

## 2018-03-09 RX ADMIN — CLONIDINE HYDROCHLORIDE SCH MG: 0.1 INJECTION, SOLUTION EPIDURAL at 06:09

## 2018-03-09 RX ADMIN — POTASSIUM CHLORIDE SCH MEQ: 750 CAPSULE, EXTENDED RELEASE ORAL at 08:33

## 2018-03-09 RX ADMIN — FUROSEMIDE SCH MG: 20 TABLET ORAL at 08:33

## 2018-03-09 RX ADMIN — METOPROLOL TARTRATE SCH MG: 25 TABLET, FILM COATED ORAL at 08:34

## 2018-03-09 RX ADMIN — HYDROCODONE BITARTRATE AND ACETAMINOPHEN PRN TAB: 7.5; 325 TABLET ORAL at 21:49

## 2018-03-09 RX ADMIN — CLONIDINE HYDROCHLORIDE SCH MG: 0.1 INJECTION, SOLUTION EPIDURAL at 00:33

## 2018-03-09 RX ADMIN — CLONIDINE HYDROCHLORIDE SCH MG: 0.1 INJECTION, SOLUTION EPIDURAL at 12:12

## 2018-03-09 RX ADMIN — Medication SCH ML: at 08:33

## 2018-03-09 RX ADMIN — METOPROLOL TARTRATE SCH MG: 25 TABLET, FILM COATED ORAL at 20:43

## 2018-03-09 NOTE — HHI.PR
Subjective


Remarks


Follow-up polyarthralgias and inability to walk.  Improving joint pains but 

still having difficulty ambulating.  Discussed with nursing and case management





Objective


Vitals





Vital Signs








  Date Time  Temp Pulse Resp B/P (MAP) Pulse Ox O2 Delivery O2 Flow Rate FiO2


 


3/9/18 08:15 97.4 60 20 112/71 (85) 97   


 


3/9/18 04:10  74      


 


3/9/18 03:19 98.0 62 18 119/73 (88) 97   


 


3/9/18 02:52   15     


 


3/9/18 01:33  75      


 


3/9/18 01:33   15     


 


3/9/18 01:24 98.4 61 18 113/66 (82) 99   


 


3/8/18 20:23 97.8 85 18 126/74 (91) 99   


 


3/8/18 17:20 97.6 61 18 130/69 (89) 97   


 


3/8/18 13:34 98.6 65 18 106/63 (77) 98   








Result Diagram:  


3/9/18 0625                                                                    

            3/7/18 1834





Imaging





Last Impressions








Knee X-Ray 3/7/18 0000 Signed





Impressions: 





 Service Date/Time:  Wednesday, March 7, 2018 21:07 - CONCLUSION:  Findings 





 characteristic of a moderately large knee effusion.  No fracture seen.  





 Degenerative changes with chondrocalcinosis menisci.     Eliud Balderrama MD 








Objective Remarks


GENERAL: Well-nourished, well-developed middle aged male patient in [facility].


SKIN: Warm and dry. No rash.


CARDIOVASCULAR: Irregular rate and rhythm.  S1, S2 noted. No murmur 

appreciated. 


RESPIRATORY: No accessory muscle use. Clear to auscultation. Breath sounds 

equal bilaterally.  


GASTROINTESTINAL: Abdomen soft, non-tender, nondistended. Normoactive bowel 

sounds x4.


MUSCULOSKELETAL: No obvious deformities. Bilateral knees with effusions, tender 

to palpation, however no erythema/warmth. 


NEUROLOGICAL: Awake and alert. No obvious cranial nerve deficits.  Motor 

grossly within normal limits. 5/5 muscle strength in bilateral upper and lower 

extremities.  Normal speech.


PSYCHIATRIC: Appropriate mood and affect; insight and judgment normal.


Procedures


none





A/P


Problem List:  


(1) Knee effusion, left


ICD Code:  M25.462 - Effusion, left knee


(2) Knee effusion, right


ICD Code:  M25.461 - Effusion, right knee


(3) Inability to ambulate due to multiple joints


ICD Code:  R26.2 - Difficulty in walking, not elsewhere classified


Assessment and Plan


62-year-old male with a history of osteoarthritis, rheumatoid arthritis, gout, 

atrial fibrillation on Eliquis, asthma, hypertension, spinal stenosis, and 

tobacco abuse who presented to the ED on 3/7/18 complaining of inability to 

ambulate and multiple arthralgias.  Despite analgesics provided in the ED, the 

patient remained nonambulatory due to pain and bilateral knee xrays demonstrate 

knee effusions.  The patient is admitted to [facility] for intractable pain with 

immobility.





Bilateral knee effusions with intractable pain and inability to ambulate.  

Improving pain.  


   - consult orthopedic surgery - appreciate assistance


   - Continue Norco 7.5/325 mg p.o. q4h PRN pain


   - consult physical therapy, recommends walker vs wheelchair


   - given Decadron 8 mg IV x1 in ED


   - continue daily physical therapy


   - will give IV Toradol 15mg q6h [id number]


   - ESR 90 and CRP 17


   - case management to assist with discharge planning





Leukocytosis: suspect stress response, no signs of infection. WBC 14.5 with 

neutrophilia; patient is afebrile


   - repeat CBC, however patient did receive steroids and WBC may increase





Hypokalemia:  K+ 3.1 on admission


   - replaced potassium


   - recheck BMP  and monitor levels and replace as needed





Atrial fibrillation, CHF: chronic, stable


   - continue Eliquis, metoprolol, lasix, potassium


   - monitor I and Os


   - continuous cardiac telemetry to monitor for cardiac arrhythmia





Tobacco abuse


   - counselled regarding cessation





DVT prophylaxis- on Eliquis


Discharge Planning


Discharge patient to home


Condition on discharge: Improved


Regular Diet as tolerated


Ad Nan activity


Rx written: Potassium


Follow-up with primary care physician cardiology and orthopedic surgery











Jairon Cardenas MD Mar 9, 2018 08:22

## 2018-03-10 VITALS
TEMPERATURE: 97.6 F | OXYGEN SATURATION: 96 % | SYSTOLIC BLOOD PRESSURE: 115 MMHG | RESPIRATION RATE: 18 BRPM | DIASTOLIC BLOOD PRESSURE: 71 MMHG | HEART RATE: 60 BPM

## 2018-03-10 VITALS
DIASTOLIC BLOOD PRESSURE: 69 MMHG | HEART RATE: 61 BPM | SYSTOLIC BLOOD PRESSURE: 115 MMHG | RESPIRATION RATE: 18 BRPM | TEMPERATURE: 97.9 F | OXYGEN SATURATION: 95 %

## 2018-03-10 VITALS
SYSTOLIC BLOOD PRESSURE: 122 MMHG | RESPIRATION RATE: 18 BRPM | HEART RATE: 71 BPM | DIASTOLIC BLOOD PRESSURE: 72 MMHG | TEMPERATURE: 96.3 F | OXYGEN SATURATION: 93 %

## 2018-03-10 VITALS
TEMPERATURE: 98.1 F | OXYGEN SATURATION: 96 % | HEART RATE: 69 BPM | DIASTOLIC BLOOD PRESSURE: 77 MMHG | SYSTOLIC BLOOD PRESSURE: 113 MMHG | RESPIRATION RATE: 18 BRPM

## 2018-03-10 VITALS
DIASTOLIC BLOOD PRESSURE: 76 MMHG | SYSTOLIC BLOOD PRESSURE: 120 MMHG | TEMPERATURE: 97.9 F | RESPIRATION RATE: 18 BRPM | OXYGEN SATURATION: 97 % | HEART RATE: 58 BPM

## 2018-03-10 VITALS
TEMPERATURE: 97 F | RESPIRATION RATE: 18 BRPM | OXYGEN SATURATION: 96 % | DIASTOLIC BLOOD PRESSURE: 72 MMHG | HEART RATE: 61 BPM | SYSTOLIC BLOOD PRESSURE: 115 MMHG

## 2018-03-10 VITALS — HEART RATE: 64 BPM

## 2018-03-10 RX ADMIN — HYDROCODONE BITARTRATE AND ACETAMINOPHEN PRN TAB: 7.5; 325 TABLET ORAL at 17:56

## 2018-03-10 RX ADMIN — CLONIDINE HYDROCHLORIDE SCH MG: 0.1 INJECTION, SOLUTION EPIDURAL at 17:57

## 2018-03-10 RX ADMIN — Medication SCH ML: at 09:51

## 2018-03-10 RX ADMIN — POTASSIUM CHLORIDE SCH MEQ: 750 CAPSULE, EXTENDED RELEASE ORAL at 09:51

## 2018-03-10 RX ADMIN — CLONIDINE HYDROCHLORIDE SCH MG: 0.1 INJECTION, SOLUTION EPIDURAL at 12:24

## 2018-03-10 RX ADMIN — METOPROLOL TARTRATE SCH MG: 25 TABLET, FILM COATED ORAL at 21:00

## 2018-03-10 RX ADMIN — HYDROCODONE BITARTRATE AND ACETAMINOPHEN PRN TAB: 7.5; 325 TABLET ORAL at 10:02

## 2018-03-10 RX ADMIN — HYDROCODONE BITARTRATE AND ACETAMINOPHEN PRN TAB: 7.5; 325 TABLET ORAL at 14:22

## 2018-03-10 RX ADMIN — METOPROLOL TARTRATE SCH MG: 25 TABLET, FILM COATED ORAL at 09:52

## 2018-03-10 RX ADMIN — APIXABAN SCH MG: 5 TABLET, FILM COATED ORAL at 09:52

## 2018-03-10 RX ADMIN — FUROSEMIDE SCH MG: 20 TABLET ORAL at 09:51

## 2018-03-10 NOTE — HHI.PR
Subjective


Remarks


Follow-up arthralgias and inability to walk.  Improving pain still having 

difficulty ambulating.  Discussed with case management, awaiting insurance 

approval for wheelchair.  Consulted [facility] and SNF to evaluate patient, patient 

prefers to be discharged to SNF





Objective


Vitals





Vital Signs








  Date Time  Temp Pulse Resp B/P (MAP) Pulse Ox O2 Delivery O2 Flow Rate FiO2


 


3/10/18 03:33 97.9 58 18 120/76 (91) 97   


 


3/10/18 00:07 97.9 61 18 115/69 (84) 95   


 


3/9/18 20:32 98.0 62 18 119/71 (87) 94   


 


3/9/18 16:30 97.6 70 18 118/73 (88) 95   


 


3/9/18 12:14 97.4 73 18 106/67 (80) 96   


 


3/9/18 08:15 97.4 60 20 112/71 (85) 97   














I/O      


 


 3/9/18 3/9/18 3/9/18 3/10/18 3/10/18 3/10/18





 07:00 15:00 23:00 07:00 15:00 23:00


 


Intake Total   240 ml   


 


Output Total   95 ml   


 


Balance   145 ml   


 


      


 


Intake Oral   240 ml   


 


Output Urine Total   95 ml   


 


# Voids   3   








Result Diagram:  


3/9/18 0625                                                                    

            3/9/18 0625





Imaging





Last Impressions








Knee X-Ray 3/7/18 0000 Signed





Impressions: 





 Service Date/Time:  Wednesday, March 7, 2018 21:07 - CONCLUSION:  Findings 





 characteristic of a moderately large knee effusion.  No fracture seen.  





 Degenerative changes with chondrocalcinosis menisci.     Eliud Balderrama MD 








Objective Remarks


GENERAL: Well-nourished, well-developed middle aged male patient in [facility].


SKIN: Warm and dry. No rash.


CARDIOVASCULAR: Irregular rate and rhythm.  S1, S2 noted. No murmur 

appreciated. 


RESPIRATORY: No accessory muscle use. Clear to auscultation. Breath sounds 

equal bilaterally.  


GASTROINTESTINAL: Abdomen soft, non-tender, nondistended. Normoactive bowel 

sounds x4.


MUSCULOSKELETAL: No obvious deformities. Bilateral knees with effusions, tender 

to palpation, however no erythema/warmth. 


NEUROLOGICAL: Awake and alert. No obvious cranial nerve deficits.  Motor 

grossly within normal limits. 5/5 muscle strength in bilateral upper and lower 

extremities.  Normal speech.


PSYCHIATRIC: Appropriate mood and affect; insight and judgment normal.


Procedures


none





A/P


Problem List:  


(1) Knee effusion, left


ICD Code:  M25.462 - Effusion, left knee


(2) Knee effusion, right


ICD Code:  M25.461 - Effusion, right knee


(3) Inability to ambulate due to multiple joints


ICD Code:  R26.2 - Difficulty in walking, not elsewhere classified


Assessment and Plan


62-year-old male with a history of osteoarthritis, rheumatoid arthritis, gout, 

atrial fibrillation on Eliquis, asthma, hypertension, spinal stenosis, and 

tobacco abuse who presented to the ED on 3/7/18 complaining of inability to 

ambulate and multiple arthralgias.  Despite analgesics provided in the ED, the 

patient remained nonambulatory due to pain and bilateral knee xrays demonstrate 

knee effusions.  The patient is admitted to [facility] for intractable pain with 

immobility.





Bilateral knee effusions with intractable pain and inability to ambulate.  

Improving pain.  


   - consulted orthopedic surgery - appreciate assistance


   - Continue Norco 7.5/325 mg p.o. q4h PRN pain


   - consult physical therapy, recommends walker vs wheelchair


   - given Decadron 8 mg IV x1 in ED


   - continue daily physical therapy


   - will give IV Toradol 15mg q6h [id number]


   - ESR 90 and CRP 17


   - case management to assist with discharge planning





Leukocytosis: suspect stress response, no signs of infection. WBC 14.5 with 

neutrophilia; patient is afebrile


   - repeat CBC, however patient did receive steroids and WBC may increase





Hypokalemia:  K+ 3.1 on admission


   - replaced potassium


   - recheck BMP  and monitor levels and replace as needed





Atrial fibrillation, CHF: chronic, stable


   - continue Eliquis, metoprolol, lasix, potassium


   - monitor I and Os


   - continuous cardiac telemetry to monitor for cardiac arrhythmia





Tobacco abuse


   - counselled regarding cessation





DVT prophylaxis- on Eliquis


Discharge Planning


Discharge patient to home when DME arranged versus [facility] versus SNF


Condition on discharge: Improved


Regular Diet as tolerated


Ad Nan activity


Rx written: Potassium


Follow-up with primary care physician cardiology and orthopedic surgery











Jairon Cardenas MD Mar 10, 2018 07:04

## 2018-03-11 VITALS
HEART RATE: 60 BPM | DIASTOLIC BLOOD PRESSURE: 84 MMHG | TEMPERATURE: 98 F | SYSTOLIC BLOOD PRESSURE: 120 MMHG | RESPIRATION RATE: 20 BRPM | OXYGEN SATURATION: 98 %

## 2018-03-11 VITALS
OXYGEN SATURATION: 99 % | TEMPERATURE: 96.9 F | SYSTOLIC BLOOD PRESSURE: 103 MMHG | HEART RATE: 60 BPM | DIASTOLIC BLOOD PRESSURE: 55 MMHG | RESPIRATION RATE: 18 BRPM

## 2018-03-11 VITALS
SYSTOLIC BLOOD PRESSURE: 101 MMHG | RESPIRATION RATE: 20 BRPM | HEART RATE: 70 BPM | TEMPERATURE: 98.2 F | OXYGEN SATURATION: 98 % | DIASTOLIC BLOOD PRESSURE: 59 MMHG

## 2018-03-11 VITALS
HEART RATE: 67 BPM | OXYGEN SATURATION: 96 % | SYSTOLIC BLOOD PRESSURE: 129 MMHG | TEMPERATURE: 97.5 F | DIASTOLIC BLOOD PRESSURE: 69 MMHG | RESPIRATION RATE: 18 BRPM

## 2018-03-11 VITALS
RESPIRATION RATE: 16 BRPM | TEMPERATURE: 95.9 F | DIASTOLIC BLOOD PRESSURE: 80 MMHG | OXYGEN SATURATION: 95 % | HEART RATE: 65 BPM | SYSTOLIC BLOOD PRESSURE: 118 MMHG

## 2018-03-11 VITALS
RESPIRATION RATE: 21 BRPM | HEART RATE: 63 BPM | SYSTOLIC BLOOD PRESSURE: 127 MMHG | OXYGEN SATURATION: 99 % | DIASTOLIC BLOOD PRESSURE: 78 MMHG

## 2018-03-11 VITALS — HEART RATE: 75 BPM

## 2018-03-11 RX ADMIN — POTASSIUM CHLORIDE SCH MEQ: 750 CAPSULE, EXTENDED RELEASE ORAL at 08:33

## 2018-03-11 RX ADMIN — FUROSEMIDE SCH MG: 20 TABLET ORAL at 08:34

## 2018-03-11 RX ADMIN — CLONIDINE HYDROCHLORIDE SCH MG: 0.1 INJECTION, SOLUTION EPIDURAL at 12:40

## 2018-03-11 RX ADMIN — FOLIC ACID SCH MG: 1 TABLET ORAL at 08:33

## 2018-03-11 RX ADMIN — METOPROLOL TARTRATE SCH MG: 25 TABLET, FILM COATED ORAL at 08:32

## 2018-03-11 RX ADMIN — HYDROCODONE BITARTRATE AND ACETAMINOPHEN PRN TAB: 7.5; 325 TABLET ORAL at 16:37

## 2018-03-11 RX ADMIN — Medication SCH ML: at 00:41

## 2018-03-11 RX ADMIN — APIXABAN SCH MG: 5 TABLET, FILM COATED ORAL at 08:34

## 2018-03-11 RX ADMIN — METOPROLOL TARTRATE SCH MG: 25 TABLET, FILM COATED ORAL at 21:00

## 2018-03-11 RX ADMIN — CLONIDINE HYDROCHLORIDE SCH MG: 0.1 INJECTION, SOLUTION EPIDURAL at 00:40

## 2018-03-11 RX ADMIN — HYDROCODONE BITARTRATE AND ACETAMINOPHEN PRN TAB: 7.5; 325 TABLET ORAL at 08:38

## 2018-03-11 RX ADMIN — Medication SCH ML: at 22:55

## 2018-03-11 RX ADMIN — APIXABAN SCH MG: 5 TABLET, FILM COATED ORAL at 00:41

## 2018-03-11 RX ADMIN — CLONIDINE HYDROCHLORIDE SCH MG: 0.1 INJECTION, SOLUTION EPIDURAL at 06:47

## 2018-03-11 RX ADMIN — Medication SCH ML: at 08:34

## 2018-03-11 RX ADMIN — HYDROCODONE BITARTRATE AND ACETAMINOPHEN PRN TAB: 7.5; 325 TABLET ORAL at 22:54

## 2018-03-11 RX ADMIN — HYDROCODONE BITARTRATE AND ACETAMINOPHEN PRN TAB: 7.5; 325 TABLET ORAL at 00:42

## 2018-03-11 RX ADMIN — APIXABAN SCH MG: 5 TABLET, FILM COATED ORAL at 22:54

## 2018-03-11 RX ADMIN — CLONIDINE HYDROCHLORIDE SCH MG: 0.1 INJECTION, SOLUTION EPIDURAL at 16:37

## 2018-03-11 NOTE — HHI.PR
Subjective


Remarks


Follow-up polyarthralgias and inability to walk.  Improving joint pains but 

still having difficulty ambulating.  Awaiting SNF evaluation discussed with 

nursing





Objective


Vitals





Vital Signs








  Date Time  Temp Pulse Resp B/P (MAP) Pulse Ox O2 Delivery O2 Flow Rate FiO2


 


3/11/18 05:16 95.9 65 16 118/80 (93) 95   


 


3/11/18 01:19 96.9 60 18 103/55 (71) 99   


 


3/10/18 20:25 96.3 71 18 122/72 (89) 93   


 


3/10/18 15:09 98.1 69 18 113/77 (89) 96   


 


3/10/18 11:52 97.0 61 18 115/72 (86) 96   


 


3/10/18 08:15  64      


 


3/10/18 08:06 97.6 60 18 115/71 (86) 96   








Result Diagram:  


3/9/18 0625                                                                    

            3/9/18 0625





Imaging





Last Impressions








Knee X-Ray 3/7/18 0000 Signed





Impressions: 





 Service Date/Time:  Wednesday, March 7, 2018 21:07 - CONCLUSION:  Findings 





 characteristic of a moderately large knee effusion.  No fracture seen.  





 Degenerative changes with chondrocalcinosis menisci.     Eliud Balderrama MD 








Objective Remarks


GENERAL: Well-nourished, well-developed middle aged male patient in [facility].


SKIN: Warm and dry. No rash.


CARDIOVASCULAR: Irregular rate and rhythm.  S1, S2 noted. No murmur 

appreciated. 


RESPIRATORY: No accessory muscle use. Clear to auscultation. Breath sounds 

equal bilaterally.  


GASTROINTESTINAL: Abdomen soft, non-tender, nondistended. Normoactive bowel 

sounds x4.


MUSCULOSKELETAL: No obvious deformities. Bilateral knees with effusions, tender 

to palpation, however no erythema/warmth. 


NEUROLOGICAL: Awake and alert. No obvious cranial nerve deficits.  Motor 

grossly within normal limits. 5/5 muscle strength in bilateral upper and lower 

extremities.  Normal speech.


PSYCHIATRIC: Appropriate mood and affect; insight and judgment normal.


Procedures


none





A/P


Problem List:  


(1) Knee effusion, left


ICD Code:  M25.462 - Effusion, left knee


(2) Knee effusion, right


ICD Code:  M25.461 - Effusion, right knee


(3) Inability to ambulate due to multiple joints


ICD Code:  R26.2 - Difficulty in walking, not elsewhere classified


Assessment and Plan


62-year-old male with a history of osteoarthritis, rheumatoid arthritis, gout, 

atrial fibrillation on Eliquis, asthma, hypertension, spinal stenosis, and 

tobacco abuse who presented to the ED on 3/7/18 complaining of inability to 

ambulate and multiple arthralgias.  Despite analgesics provided in the ED, the 

patient remained nonambulatory due to pain and bilateral knee xrays demonstrate 

knee effusions.  The patient is admitted to [facility] for intractable pain with 

immobility.





Bilateral knee effusions with intractable pain and inability to ambulate.  

Improving pain.  


   - consulted orthopedic surgery - appreciate assistance


   - Continue Norco 7.5/325 mg p.o. q4h PRN pain


   - consult physical therapy, recommends walker vs wheelchair


   - given Decadron 8 mg IV x1 in ED


   - continue daily physical therapy


   - will give IV Toradol 15mg q6h x5 days


   - ESR 90 and CRP 17


   - case management to assist with discharge planning referred to SNF and correction





Leukocytosis: suspect stress response, no signs of infection. WBC 14.5 with 

neutrophilia; patient is afebrile


   - repeat CBC, however patient did receive steroids and WBC may increase





Hypokalemia:  K+ 3.1 on admission


   - replaced potassium


   - recheck BMP  and monitor levels and replace as needed





Atrial fibrillation, CHF: chronic, stable


   - continue Eliquis, metoprolol, lasix, potassium


   - monitor I and Os


   - continuous cardiac telemetry to monitor for cardiac arrhythmia





Tobacco abuse


   - counselled regarding cessation





DVT prophylaxis- on Eliquis


Discharge Planning


Discharge patient to home when DME arranged versus correction versus SNF


Condition on discharge: Improved


Regular Diet as tolerated


Ad Nan activity


Rx written: Potassium


Follow-up with primary care physician cardiology and orthopedic surgery











Jairon Cardenas MD Mar 11, 2018 06:44

## 2018-03-12 VITALS
TEMPERATURE: 98.1 F | OXYGEN SATURATION: 96 % | DIASTOLIC BLOOD PRESSURE: 77 MMHG | SYSTOLIC BLOOD PRESSURE: 120 MMHG | RESPIRATION RATE: 16 BRPM | HEART RATE: 52 BPM

## 2018-03-12 VITALS
SYSTOLIC BLOOD PRESSURE: 122 MMHG | OXYGEN SATURATION: 97 % | DIASTOLIC BLOOD PRESSURE: 68 MMHG | TEMPERATURE: 98 F | HEART RATE: 62 BPM | RESPIRATION RATE: 16 BRPM

## 2018-03-12 VITALS
HEART RATE: 65 BPM | RESPIRATION RATE: 18 BRPM | DIASTOLIC BLOOD PRESSURE: 77 MMHG | SYSTOLIC BLOOD PRESSURE: 114 MMHG | OXYGEN SATURATION: 95 % | TEMPERATURE: 98 F

## 2018-03-12 VITALS
TEMPERATURE: 97.4 F | OXYGEN SATURATION: 97 % | HEART RATE: 62 BPM | DIASTOLIC BLOOD PRESSURE: 79 MMHG | SYSTOLIC BLOOD PRESSURE: 131 MMHG | RESPIRATION RATE: 16 BRPM

## 2018-03-12 VITALS — HEART RATE: 62 BPM

## 2018-03-12 VITALS
RESPIRATION RATE: 18 BRPM | DIASTOLIC BLOOD PRESSURE: 73 MMHG | HEART RATE: 62 BPM | SYSTOLIC BLOOD PRESSURE: 126 MMHG | TEMPERATURE: 98.4 F | OXYGEN SATURATION: 96 %

## 2018-03-12 VITALS
TEMPERATURE: 97.7 F | RESPIRATION RATE: 24 BRPM | SYSTOLIC BLOOD PRESSURE: 137 MMHG | OXYGEN SATURATION: 97 % | DIASTOLIC BLOOD PRESSURE: 91 MMHG | HEART RATE: 67 BPM

## 2018-03-12 VITALS
DIASTOLIC BLOOD PRESSURE: 86 MMHG | HEART RATE: 59 BPM | OXYGEN SATURATION: 98 % | SYSTOLIC BLOOD PRESSURE: 133 MMHG | TEMPERATURE: 98.1 F | RESPIRATION RATE: 20 BRPM

## 2018-03-12 VITALS — HEART RATE: 70 BPM

## 2018-03-12 VITALS — HEART RATE: 68 BPM

## 2018-03-12 RX ADMIN — Medication SCH ML: at 09:45

## 2018-03-12 RX ADMIN — CLONIDINE HYDROCHLORIDE SCH MG: 0.1 INJECTION, SOLUTION EPIDURAL at 00:50

## 2018-03-12 RX ADMIN — CLONIDINE HYDROCHLORIDE SCH MG: 0.1 INJECTION, SOLUTION EPIDURAL at 06:30

## 2018-03-12 RX ADMIN — APIXABAN SCH MG: 5 TABLET, FILM COATED ORAL at 09:46

## 2018-03-12 RX ADMIN — Medication PRN ML: at 13:43

## 2018-03-12 RX ADMIN — HYDROCODONE BITARTRATE AND ACETAMINOPHEN PRN TAB: 7.5; 325 TABLET ORAL at 16:50

## 2018-03-12 RX ADMIN — FUROSEMIDE SCH MG: 20 TABLET ORAL at 09:46

## 2018-03-12 RX ADMIN — CLONIDINE HYDROCHLORIDE SCH MG: 0.1 INJECTION, SOLUTION EPIDURAL at 18:39

## 2018-03-12 RX ADMIN — CLONIDINE HYDROCHLORIDE SCH MG: 0.1 INJECTION, SOLUTION EPIDURAL at 13:43

## 2018-03-12 RX ADMIN — APIXABAN SCH MG: 5 TABLET, FILM COATED ORAL at 22:09

## 2018-03-12 RX ADMIN — METOPROLOL TARTRATE SCH MG: 25 TABLET, FILM COATED ORAL at 22:09

## 2018-03-12 RX ADMIN — CLONIDINE HYDROCHLORIDE SCH MG: 0.1 INJECTION, SOLUTION EPIDURAL at 23:08

## 2018-03-12 RX ADMIN — METOPROLOL TARTRATE SCH MG: 25 TABLET, FILM COATED ORAL at 09:46

## 2018-03-12 RX ADMIN — FOLIC ACID SCH MG: 1 TABLET ORAL at 09:58

## 2018-03-12 RX ADMIN — POTASSIUM CHLORIDE SCH MEQ: 750 CAPSULE, EXTENDED RELEASE ORAL at 09:45

## 2018-03-12 RX ADMIN — HYDROCODONE BITARTRATE AND ACETAMINOPHEN PRN TAB: 7.5; 325 TABLET ORAL at 23:08

## 2018-03-12 RX ADMIN — HYDROCODONE BITARTRATE AND ACETAMINOPHEN PRN TAB: 7.5; 325 TABLET ORAL at 09:59

## 2018-03-12 RX ADMIN — Medication SCH ML: at 22:10

## 2018-03-12 NOTE — HHI.DS
__________________________________________________





Discharge Summary


Admission Date


Mar 7, 2018 at 23:55


Discharge Date:  Mar 12, 2018


Admitting Diagnosis





Intractable pain, arthralgias





(1) Knee effusion, left


ICD Code:  M25.462 - Effusion, left knee


(2) Knee effusion, right


ICD Code:  M25.461 - Effusion, right knee


(3) Inability to ambulate due to multiple joints


ICD Code:  R26.2 - Difficulty in walking, not elsewhere classified


Procedures


none


Brief History - From Admission


Mr Kowalski is a 62 year-old male with a history of osteoarthritis, rheumatoid 

arthritis, gout, atrial fibrillation on Eliquis, asthma, hypertension, spinal 

stenosis, and tobacco abuse who presented to the ED on 3/7/18 complaining of 

inability to ambulate and multiple arthralgias.  Despite analgesics provided in 

the ED, the patient remained nonambulatory due to pain and bilateral knee xrays 

demonstrate knee effusions.  The patient is admitted to [facility] for intractable 

pain with immobility.





The patient is seen in the ER.  He is complaining of severe bilateral knee pain

, ankle pain, and foot pain.  He states his symptoms have been present for a 

couple of days but when it became so severe that he could not longer ambulate, 

he decided to seek emergency medical treatment.  He reports aching and sharp 

pain rated 10/10.  He does appear to be in obvious pain when attempting to move 

on the ED stretcher.  He has palpable and tender bilateral knee effusions.  His 

states that his left leg is rotating externally as a result of his knee pain.  

He is tender to knees, ankles, and foot palpation.  Denies fevers, nausea, 

vomiting, diarrhea, chest pain, shortness of breath.  Reports chills a couple 

of days ago and none since until feeling cold at the hospital.  Afebrile here.


.


CBC/BMP:  


3/9/18 0625                                                                    

            3/9/18 0625





Imaging





Last Impressions








Knee X-Ray 3/7/18 0000 Signed





Impressions: 





 Service Date/Time:  Wednesday, March 7, 2018 21:07 - CONCLUSION:  Findings 





 characteristic of a moderately large knee effusion.  No fracture seen.  





 Degenerative changes with chondrocalcinosis menisci.     Eliud Balderrama MD 








PE at Discharge


GENERAL: Well-nourished, well-developed middle aged male patient in [facility].


SKIN: Warm and dry. No rash.


CARDIOVASCULAR: Irregular rate and rhythm.  S1, S2 noted. No murmur 

appreciated. 


RESPIRATORY: No accessory muscle use. Clear to auscultation. Breath sounds 

equal bilaterally.  


GASTROINTESTINAL: Abdomen soft, non-tender, nondistended. Normoactive bowel 

sounds x4.


MUSCULOSKELETAL: No obvious deformities. Bilateral knees with effusions, tender 

to palpation, however no erythema/warmth. 


NEUROLOGICAL: Awake and alert. No obvious cranial nerve deficits.  Motor 

grossly within normal limits. 5/5 muscle strength in bilateral upper and lower 

extremities.  Normal speech.


PSYCHIATRIC: Appropriate mood and affect; insight and judgment normal.


Hospital Course





62-year-old male with a history of osteoarthritis, rheumatoid arthritis, gout, 

atrial fibrillation on Eliquis, asthma, hypertension, spinal stenosis, and 

tobacco abuse who presented to the ED on 3/7/18 complaining of inability to 

ambulate and multiple arthralgias.  Despite analgesics provided in the ED, the 

patient remained nonambulatory due to pain and bilateral knee xrays demonstrate 

knee effusions.  The patient is admitted to [facility] for intractable pain with 

immobility.





Bilateral knee effusions with intractable pain and inability to ambulate.  

Improving pain.  


   - consulted orthopedic surgery - appreciate assistance


   - Continue Norco 7.5/325 mg p.o. q4h PRN pain


   - consult physical therapy, recommends walker vs wheelchair


   - given Decadron 8 mg IV x1 in ED


   - continue daily physical therapy


   - will give IV Toradol 15mg q6h x5 days


   - ESR 90 and CRP 17


   - case management to assist with discharge planning referred to SNF and [facility]





Leukocytosis: suspect stress response, no signs of infection. WBC 14.5 with 

neutrophilia; patient is afebrile


   - repeat CBC, however patient did receive steroids and WBC may increase





Hypokalemia:  K+ 3.1 on admission


   - replaced potassium


   - recheck BMP  and monitor levels and replace as needed





Atrial fibrillation, CHF: chronic, stable


   - continue Eliquis, metoprolol, lasix, potassium


   - monitor I and Os


   - continuous cardiac telemetry to monitor for cardiac arrhythmia





Tobacco abuse


   - counselled regarding cessation





DVT prophylaxis- on Eliquis


Discharge Planning


Discharge patient to home when DME arranged versus longterm versus SNF


Condition on discharge: Improved


Regular Diet as tolerated


Ad Nan activity


Rx written: Potassium


Follow-up with primary care physician cardiology and orthopedic surgery


Pt Condition on Discharge:  Stable


Discharge Disposition:  Discharge to SNF


Discharge Time:  > 30 minutes


Discharge Instructions


DIET: Follow Instructions for:  Heart Healthy Diet


Activities you can perform:  Regular-No Restrictions


Activities to Avoid:  Driving


Follow up Referrals:  


Appointment for Follow Up - 1 Week with Erik Pruitt MD


Orthopedics - 1 Week with Mirza Roberts MD


PCP Follow-up - 1 Week with [facility]





New Medications:  


Wheelchair (Wheelchair) 1 Mis Mis


EA .XX AS DIRECTED, #1 0 Refills





 


Changed Medications:  


Potassium Chloride ER (Potassium Chloride ER) 10 Meq Cap


20 MEQ PO DAILY for Electrolyte Replacement, #60 CAP 0 Refills (Changed from: 

10 MEQ; 30)





 


Continued Medications:  


Amlodipine (Amlodipine) 5 Mg Tab


5 MG PO DAILY for Blood Pressure Management, #30 TAB 0 Refills





Apixaban (Eliquis) 5 Mg Tab


5 MG PO BID for A fib, #60 TAB





Folic Acid (Folic Acid) 1 Mg Tablet


1 MG PO DAILY





Furosemide (Lasix) 20 Mg Tab


20 MG PO DAILY, #30 TAB 0 Refills





Methotrexate (Methotrexate) 2.5 Mg Tab


20 MG PO Q7D, TAB 0 Refills


Saturdays


Metoprolol Tartrate (Lopressor) 50 Mg Tab


75 MG PO Q12HR for A fib, #90 TAB

















Justa Richter MD Mar 12, 2018 16:59

## 2018-03-12 NOTE — HHI.PR
Subjective


Remarks


In bed appears in nad 


Says he has pain in his knees, swelling improved. 


Says she has no sob or chest pain. No n/v/d/c. 


Denies fevers or chills.





Objective


Vitals





Vital Signs








  Date Time  Temp Pulse Resp B/P (MAP) Pulse Ox O2 Delivery O2 Flow Rate FiO2


 


3/12/18 16:46 98.1 52 16 120/77 (91) 96   


 


3/12/18 12:20 98.0 62 16 122/68 (86) 97   


 


3/12/18 08:21 97.7 67 24 137/91 (106) 97   


 


3/12/18 03:26 98.4 62 18 126/73 (90) 96   


 


3/12/18 00:07 98.0 65 18 114/77 (89) 95   


 


3/11/18 21:22 97.5 67 18 129/69 (89) 96   














I/O      


 


 3/11/18 3/11/18 3/11/18 3/12/18 3/12/18 3/12/18





 07:00 15:00 23:00 07:00 15:00 23:00


 


Output Total   200 ml  600 ml 


 


Balance   -200 ml  -600 ml 


 


      


 


Output Urine Total   200 ml  600 ml 


 


# Voids      1


 


# Bowel Movements      1








Result Diagram:  


3/9/18 0625                                                                    

            3/9/18 0625





Imaging





Last Impressions








Knee X-Ray 3/7/18 0000 Signed





Impressions: 





 Service Date/Time:  Wednesday, March 7, 2018 21:07 - CONCLUSION:  Findings 





 characteristic of a moderately large knee effusion.  No fracture seen.  





 Degenerative changes with chondrocalcinosis menisci.     Eliud Balderrama MD 








Objective Remarks


GENERAL: Well-nourished, well-developed middle aged male patient in [facility].


SKIN: Warm and dry. No rash.


CARDIOVASCULAR: Irregular rate and rhythm.  S1, S2 noted. No murmur 

appreciated. 


RESPIRATORY: No accessory muscle use. Clear to auscultation. Breath sounds 

equal bilaterally.  


GASTROINTESTINAL: Abdomen soft, non-tender, nondistended. Normoactive bowel 

sounds x4.


MUSCULOSKELETAL: No obvious deformities. Bilateral knees with effusions, tender 

to palpation, however no erythema/warmth. 


NEUROLOGICAL: Awake and alert. No obvious cranial nerve deficits.  Motor 

grossly within normal limits. 5/5 muscle strength in bilateral upper and lower 

extremities.  Normal speech.


PSYCHIATRIC: Appropriate mood and affect; insight and judgment normal.


Procedures


none





A/P


Problem List:  


(1) Knee effusion, left


ICD Code:  M25.462 - Effusion, left knee


(2) Knee effusion, right


ICD Code:  M25.461 - Effusion, right knee


(3) Inability to ambulate due to multiple joints


ICD Code:  R26.2 - Difficulty in walking, not elsewhere classified


Assessment and Plan





62-year-old male with a history of osteoarthritis, rheumatoid arthritis, gout, 

atrial fibrillation on Eliquis, asthma, hypertension, spinal stenosis, and 

tobacco abuse who presented to the ED on 3/7/18 complaining of inability to 

ambulate and multiple arthralgias.  Despite analgesics provided in the ED, the 

patient remained nonambulatory due to pain and bilateral knee xrays demonstrate 

knee effusions.  The patient is admitted to [facility] for intractable pain with 

immobility.





Bilateral knee effusions with intractable pain and inability to ambulate.  

Improving pain.  


   - consulted orthopedic surgery - appreciate assistance


   - Continue Norco 7.5/325 mg p.o. q4h PRN pain


   - consult physical therapy, recommends walker vs wheelchair


   - given Decadron 8 mg IV x1 in ED


   - continue daily physical therapy


   - will give IV Toradol 15mg q6h x5 days


   - ESR 90 and CRP 17


   - case management to assist with discharge planning referred to SNF and retirement





Leukocytosis: suspect stress response, no signs of infection. WBC 14.5 with 

neutrophilia; patient is afebrile


   - repeat CBC, however patient did receive steroids and WBC may increase





Hypokalemia:  K+ 3.1 on admission


   - replaced potassium


   - recheck BMP  and monitor levels and replace as needed





Atrial fibrillation, CHF: chronic, stable


   - continue Eliquis, metoprolol, lasix, potassium


   - monitor I and Os


   - continuous cardiac telemetry to monitor for cardiac arrhythmia





Tobacco abuse


   - counselled regarding cessation





DVT prophylaxis- on Eliquis





Discussed with the patient, nurse. 





Discharge Planning


Discharge patient to home when DME arranged versus [facility] versus SNF


Condition on discharge: Improved


Regular Diet as tolerated


Ad Nan activity


Rx written: Potassium


Follow-up with primary care physician cardiology and orthopedic surgery











Justa Richter MD Mar 12, 2018 16:58

## 2018-03-13 VITALS
OXYGEN SATURATION: 94 % | TEMPERATURE: 97.6 F | SYSTOLIC BLOOD PRESSURE: 130 MMHG | RESPIRATION RATE: 16 BRPM | HEART RATE: 65 BPM | DIASTOLIC BLOOD PRESSURE: 81 MMHG

## 2018-03-13 VITALS — HEART RATE: 60 BPM

## 2018-03-13 VITALS
HEART RATE: 58 BPM | RESPIRATION RATE: 16 BRPM | OXYGEN SATURATION: 98 % | DIASTOLIC BLOOD PRESSURE: 83 MMHG | SYSTOLIC BLOOD PRESSURE: 133 MMHG | TEMPERATURE: 97.6 F

## 2018-03-13 VITALS — HEART RATE: 65 BPM

## 2018-03-13 VITALS — HEART RATE: 57 BPM

## 2018-03-13 VITALS — HEART RATE: 67 BPM

## 2018-03-13 VITALS
TEMPERATURE: 97.5 F | DIASTOLIC BLOOD PRESSURE: 82 MMHG | SYSTOLIC BLOOD PRESSURE: 126 MMHG | OXYGEN SATURATION: 97 % | RESPIRATION RATE: 16 BRPM | HEART RATE: 58 BPM

## 2018-03-13 VITALS
TEMPERATURE: 97.2 F | HEART RATE: 63 BPM | RESPIRATION RATE: 16 BRPM | DIASTOLIC BLOOD PRESSURE: 77 MMHG | SYSTOLIC BLOOD PRESSURE: 130 MMHG | OXYGEN SATURATION: 96 %

## 2018-03-13 VITALS — HEART RATE: 62 BPM

## 2018-03-13 VITALS — HEART RATE: 72 BPM

## 2018-03-13 RX ADMIN — METOPROLOL TARTRATE SCH MG: 25 TABLET, FILM COATED ORAL at 22:55

## 2018-03-13 RX ADMIN — HYDROCODONE BITARTRATE AND ACETAMINOPHEN PRN TAB: 7.5; 325 TABLET ORAL at 17:32

## 2018-03-13 RX ADMIN — POTASSIUM CHLORIDE SCH MEQ: 750 CAPSULE, EXTENDED RELEASE ORAL at 11:25

## 2018-03-13 RX ADMIN — APIXABAN SCH MG: 5 TABLET, FILM COATED ORAL at 11:26

## 2018-03-13 RX ADMIN — HYDROCODONE BITARTRATE AND ACETAMINOPHEN PRN TAB: 7.5; 325 TABLET ORAL at 22:53

## 2018-03-13 RX ADMIN — HYDROCODONE BITARTRATE AND ACETAMINOPHEN PRN TAB: 7.5; 325 TABLET ORAL at 05:58

## 2018-03-13 RX ADMIN — CLONIDINE HYDROCHLORIDE SCH MG: 0.1 INJECTION, SOLUTION EPIDURAL at 05:59

## 2018-03-13 RX ADMIN — METOPROLOL TARTRATE SCH MG: 25 TABLET, FILM COATED ORAL at 09:00

## 2018-03-13 RX ADMIN — Medication SCH ML: at 11:25

## 2018-03-13 RX ADMIN — HYDROCODONE BITARTRATE AND ACETAMINOPHEN PRN TAB: 7.5; 325 TABLET ORAL at 11:26

## 2018-03-13 RX ADMIN — Medication SCH ML: at 21:00

## 2018-03-13 RX ADMIN — FOLIC ACID SCH MG: 1 TABLET ORAL at 09:00

## 2018-03-13 RX ADMIN — APIXABAN SCH MG: 5 TABLET, FILM COATED ORAL at 22:53

## 2018-03-13 RX ADMIN — FUROSEMIDE SCH MG: 20 TABLET ORAL at 11:26

## 2018-03-13 NOTE — HHI.PR
Subjective


Remarks


At the margin of the bed. Pain in his knees is better controlled. Swelling in 

knees improved. No erythema. No n/v/d/c. Denies fever or chills.





Objective


Vitals





Vital Signs








  Date Time  Temp Pulse Resp B/P (MAP) Pulse Ox O2 Delivery O2 Flow Rate FiO2


 


3/13/18 08:00 97.5 58 16 126/82 (97) 97   


 


3/13/18 04:07  57      


 


3/13/18 03:43 97.6 58 16 133/83 (100) 98   


 


3/13/18 00:03  65      


 


3/12/18 23:58 98.1 59 20 133/86 (102) 98   


 


3/12/18 20:41 97.4 62 16 131/79 (96) 97   


 


3/12/18 20:08  68      


 


3/12/18 16:46 98.1 52 16 120/77 (91) 96   


 


3/12/18 15:52  70      


 


3/12/18 12:20 98.0 62 16 122/68 (86) 97   


 


3/12/18 12:04  62      














I/O      


 


 3/12/18 3/12/18 3/12/18 3/13/18 3/13/18 3/13/18





 07:00 15:00 23:00 07:00 15:00 23:00


 


Output Total  600 ml    


 


Balance  -600 ml    


 


      


 


Output Urine Total  600 ml    


 


# Voids   1   


 


# Bowel Movements   1   








Result Diagram:  


3/9/18 0625                                                                    

            3/9/18 0625





Imaging





Last Impressions








Knee X-Ray 3/7/18 0000 Signed





Impressions: 





 Service Date/Time:  Wednesday, March 7, 2018 21:07 - CONCLUSION:  Findings 





 characteristic of a moderately large knee effusion.  No fracture seen.  





 Degenerative changes with chondrocalcinosis menisci.     Eliud Balderrama MD 








Objective Remarks


GENERAL: Well-nourished, well-developed middle aged male patient in [facility].


SKIN: Warm and dry. No rash.


CARDIOVASCULAR: Irregular rate and rhythm.  S1, S2 noted. No murmur 

appreciated. 


RESPIRATORY: No accessory muscle use. Clear to auscultation. Breath sounds 

equal bilaterally.  


GASTROINTESTINAL: Abdomen soft, non-tender, nondistended. Normoactive bowel 

sounds x4.


MUSCULOSKELETAL: No obvious deformities. Bilateral knees with effusions, tender 

to palpation, however no erythema/warmth. 


NEUROLOGICAL: Awake and alert. No obvious cranial nerve deficits.  Motor 

grossly within normal limits. 5/5 muscle strength in bilateral upper and lower 

extremities.  Normal speech.


PSYCHIATRIC: Appropriate mood and affect; insight and judgment normal.


Procedures


none





A/P


Problem List:  


(1) Knee effusion, left


ICD Code:  M25.462 - Effusion, left knee


(2) Knee effusion, right


ICD Code:  M25.461 - Effusion, right knee


(3) Inability to ambulate due to multiple joints


ICD Code:  R26.2 - Difficulty in walking, not elsewhere classified


Assessment and Plan





62-year-old male with a history of osteoarthritis, rheumatoid arthritis, gout, 

atrial fibrillation on Eliquis, asthma, hypertension, spinal stenosis, and 

tobacco abuse who presented to the ED on 3/7/18 complaining of inability to 

ambulate and multiple arthralgias.  Despite analgesics provided in the ED, the 

patient remained nonambulatory due to pain and bilateral knee xrays demonstrate 

knee effusions.  The patient is admitted to [facility] for intractable pain with 

immobility.





Bilateral knee effusions with intractable pain and inability to ambulate.  

Improving pain.  


   - consulted orthopedic surgery - appreciate assistance


   - Continue Norco 7.5/325 mg p.o. q4h PRN pain


   - consult physical therapy, recommends walker vs wheelchair


   - given Decadron 8 mg IV x1 in ED


   - continue daily physical therapy


   - will give IV Toradol 15mg q6h x5 days


   - ESR 90 and CRP 17


   - case management to assist with discharge planning referred to SNF and [facility]





Leukocytosis: suspect stress response, no signs of infection. WBC 14.5 with 

neutrophilia; patient is afebrile


   - repeat CBC, however patient did receive steroids and WBC may increase





Hypokalemia:  K+ 3.1 on admission


   - replaced potassium


   - recheck BMP  and monitor levels and replace as needed





Atrial fibrillation, CHF: chronic, stable


   - continue Eliquis, metoprolol, lasix, potassium


   - monitor I and Os


   - continuous cardiac telemetry to monitor for cardiac arrhythmia





Tobacco abuse


   - counselled regarding cessation





DVT prophylaxis- on Eliquis





Discussed with the patient, nurse. 





Discharge Planning


Discharge patient to home when DME arranged versus [facility] versus SNF


Condition on discharge: Improved


Regular Diet as tolerated


Ad Nan activity


Rx written: Potassium


Follow-up with primary care physician cardiology and orthopedic surgery











Justa Richter MD Mar 13, 2018 10:59

## 2018-03-14 VITALS
OXYGEN SATURATION: 99 % | HEART RATE: 56 BPM | RESPIRATION RATE: 16 BRPM | TEMPERATURE: 97.7 F | DIASTOLIC BLOOD PRESSURE: 85 MMHG | SYSTOLIC BLOOD PRESSURE: 129 MMHG

## 2018-03-14 VITALS
RESPIRATION RATE: 16 BRPM | DIASTOLIC BLOOD PRESSURE: 79 MMHG | OXYGEN SATURATION: 98 % | HEART RATE: 60 BPM | TEMPERATURE: 98.4 F | SYSTOLIC BLOOD PRESSURE: 127 MMHG

## 2018-03-14 VITALS — HEART RATE: 66 BPM

## 2018-03-14 VITALS — HEART RATE: 60 BPM

## 2018-03-14 VITALS
HEART RATE: 56 BPM | RESPIRATION RATE: 18 BRPM | OXYGEN SATURATION: 99 % | TEMPERATURE: 97.6 F | SYSTOLIC BLOOD PRESSURE: 136 MMHG | DIASTOLIC BLOOD PRESSURE: 95 MMHG

## 2018-03-14 VITALS
SYSTOLIC BLOOD PRESSURE: 126 MMHG | HEART RATE: 63 BPM | OXYGEN SATURATION: 99 % | TEMPERATURE: 97.7 F | DIASTOLIC BLOOD PRESSURE: 92 MMHG | RESPIRATION RATE: 18 BRPM

## 2018-03-14 RX ADMIN — FOLIC ACID SCH MG: 1 TABLET ORAL at 10:27

## 2018-03-14 RX ADMIN — APIXABAN SCH MG: 5 TABLET, FILM COATED ORAL at 10:12

## 2018-03-14 RX ADMIN — FUROSEMIDE SCH MG: 20 TABLET ORAL at 10:11

## 2018-03-14 RX ADMIN — HYDROCODONE BITARTRATE AND ACETAMINOPHEN PRN TAB: 7.5; 325 TABLET ORAL at 10:27

## 2018-03-14 RX ADMIN — POTASSIUM CHLORIDE SCH MEQ: 750 CAPSULE, EXTENDED RELEASE ORAL at 10:11

## 2018-03-14 RX ADMIN — METOPROLOL TARTRATE SCH MG: 25 TABLET, FILM COATED ORAL at 10:26

## 2018-03-14 RX ADMIN — Medication SCH ML: at 10:12

## 2018-03-14 NOTE — HHI.PR
Subjective


Remarks


In bed appears in nad. 


Denies chest pain or sob. 


Pain in his knee improved. No swelling of his knees. 


Not able to walk much





Objective


Vitals





Vital Signs








  Date Time  Temp Pulse Resp B/P (MAP) Pulse Ox O2 Delivery O2 Flow Rate FiO2


 


3/14/18 12:54 97.7 63 18 126/92 (103) 99   


 


3/14/18 08:28 97.6 56 18 136/95 (109) 99   


 


3/14/18 04:05  60      


 


3/14/18 03:27 97.7 56 16 129/85 (100) 99   


 


3/14/18 00:36 98.4 60 16 127/79 (95) 98   


 


3/14/18 00:05  66      


 


3/13/18 20:04  72      


 


3/13/18 16:04  67      


 


3/13/18 16:00 98.0 80 18 130/81 (97) 94   














I/O      


 


 3/13/18 3/13/18 3/13/18 3/14/18 3/14/18 3/14/18





 07:00 15:00 23:00 07:00 15:00 23:00


 


Output Total   600 ml  1100 ml 


 


Balance   -600 ml  -1100 ml 


 


      


 


Output Urine Total   600 ml  1100 ml 


 


# Voids    3  


 


# Bowel Movements    1  








Imaging





Last Impressions








Knee X-Ray 3/7/18 0000 Signed





Impressions: 





 Service Date/Time:  Wednesday, March 7, 2018 21:07 - CONCLUSION:  Findings 





 characteristic of a moderately large knee effusion.  No fracture seen.  





 Degenerative changes with chondrocalcinosis menisci.     Eliud Balderrama MD 








Objective Remarks


GENERAL: Well-nourished, well-developed middle aged male patient in [facility].


SKIN: Warm and dry. No rash.


CARDIOVASCULAR: Irregular rate and rhythm.  S1, S2 noted. No murmur 

appreciated. 


RESPIRATORY: No accessory muscle use. Clear to auscultation. Breath sounds 

equal bilaterally.  


GASTROINTESTINAL: Abdomen soft, non-tender, nondistended. Normoactive bowel 

sounds x4.


MUSCULOSKELETAL: No obvious deformities. Bilateral knees with effusions, tender 

to palpation, however no erythema/warmth. 


NEUROLOGICAL: Awake and alert. No obvious cranial nerve deficits.  Motor 

grossly within normal limits. 5/5 muscle strength in bilateral upper and lower 

extremities.  Normal speech.


PSYCHIATRIC: Appropriate mood and affect; insight and judgment normal.


Procedures


none





A/P


Problem List:  


(1) Knee effusion, left


ICD Code:  M25.462 - Effusion, left knee


(2) Knee effusion, right


ICD Code:  M25.461 - Effusion, right knee


(3) Inability to ambulate due to multiple joints


ICD Code:  R26.2 - Difficulty in walking, not elsewhere classified


Assessment and Plan





62-year-old male with a history of osteoarthritis, rheumatoid arthritis, gout, 

atrial fibrillation on Eliquis, asthma, hypertension, spinal stenosis, and 

tobacco abuse who presented to the ED on 3/7/18 complaining of inability to 

ambulate and multiple arthralgias.  Despite analgesics provided in the ED, the 

patient remained nonambulatory due to pain and bilateral knee xrays demonstrate 

knee effusions.  The patient is admitted to [facility] for intractable pain with 

immobility.





Bilateral knee effusions with intractable pain and inability to ambulate.  

Improving pain.  


   - consulted orthopedic surgery - appreciate assistance


   - Continue Norco 7.5/325 mg p.o. q4h PRN pain


   - consult physical therapy, recommends walker vs wheelchair


   - given Decadron 8 mg IV x1 in ED


   - continue daily physical therapy


   - will give IV Toradol 15mg q6h x5 days


   - ESR 90 and CRP 17


   - case management to assist with discharge planning referred to SNF and [facility]





Leukocytosis: suspect stress response, no signs of infection. WBC 14.5 with 

neutrophilia; patient is afebrile


   - repeat CBC, however patient did receive steroids and WBC may increase





Hypokalemia:  K+ 3.1 on admission


   - replaced potassium


   - recheck BMP  and monitor levels and replace as needed





Atrial fibrillation, CHF: chronic, stable


   - continue Eliquis, metoprolol, lasix, potassium


   - monitor I and Os


   - continuous cardiac telemetry to monitor for cardiac arrhythmia





Tobacco abuse


   - counselled regarding cessation





DVT prophylaxis- on Eliquis





Discussed with the patient, nurse. 





Discharge Planning


Discharge patient to home when DME arranged versus [facility] versus SNF











Justa Richter MD Mar 14, 2018 13:15